# Patient Record
Sex: FEMALE | Race: WHITE | NOT HISPANIC OR LATINO | Employment: OTHER | URBAN - METROPOLITAN AREA
[De-identification: names, ages, dates, MRNs, and addresses within clinical notes are randomized per-mention and may not be internally consistent; named-entity substitution may affect disease eponyms.]

---

## 2017-01-03 ENCOUNTER — HOSPITAL ENCOUNTER (OUTPATIENT)
Dept: GENERAL RADIOLOGY | Facility: HOSPITAL | Age: 67
Discharge: HOME | End: 2017-01-03
Admitting: NURSE PRACTITIONER

## 2017-01-03 ENCOUNTER — OFFICE VISIT (OUTPATIENT)
Dept: INTERNAL MEDICINE | Facility: CLINIC | Age: 67
End: 2017-01-03

## 2017-01-03 VITALS
SYSTOLIC BLOOD PRESSURE: 138 MMHG | HEIGHT: 62 IN | OXYGEN SATURATION: 96 % | TEMPERATURE: 102.3 F | DIASTOLIC BLOOD PRESSURE: 62 MMHG | HEART RATE: 110 BPM | WEIGHT: 106.6 LBS | BODY MASS INDEX: 19.62 KG/M2

## 2017-01-03 DIAGNOSIS — R50.9 FEVER, UNSPECIFIED FEVER CAUSE: Primary | ICD-10-CM

## 2017-01-03 DIAGNOSIS — R52 BODY ACHES: ICD-10-CM

## 2017-01-03 DIAGNOSIS — R53.83 FATIGUE, UNSPECIFIED TYPE: ICD-10-CM

## 2017-01-03 DIAGNOSIS — J32.9 SINUSITIS, UNSPECIFIED CHRONICITY, UNSPECIFIED LOCATION: ICD-10-CM

## 2017-01-03 LAB
ALBUMIN SERPL-MCNC: 3.9 G/DL (ref 3.2–4.8)
ALBUMIN/GLOB SERPL: 1.6 G/DL (ref 1.5–2.5)
ALP SERPL-CCNC: 94 U/L (ref 25–100)
ALT SERPL W P-5'-P-CCNC: 21 U/L (ref 7–40)
ANION GAP SERPL CALCULATED.3IONS-SCNC: 9 MMOL/L (ref 3–11)
AST SERPL-CCNC: 22 U/L (ref 0–33)
BASOPHILS # BLD AUTO: 0.03 10*3/MM3 (ref 0–0.2)
BASOPHILS NFR BLD AUTO: 0.3 % (ref 0–1)
BILIRUB BLD-MCNC: NEGATIVE MG/DL
BILIRUB SERPL-MCNC: 0.4 MG/DL (ref 0.3–1.2)
BUN BLD-MCNC: 11 MG/DL (ref 9–23)
BUN/CREAT SERPL: 18.3 (ref 7–25)
CALCIUM SPEC-SCNC: 9.2 MG/DL (ref 8.7–10.4)
CHLORIDE SERPL-SCNC: 100 MMOL/L (ref 99–109)
CLARITY, POC: CLEAR
CO2 SERPL-SCNC: 29 MMOL/L (ref 20–31)
COLOR UR: YELLOW
CREAT BLD-MCNC: 0.6 MG/DL (ref 0.6–1.3)
DEPRECATED RDW RBC AUTO: 44.7 FL (ref 37–54)
EOSINOPHIL # BLD AUTO: 0.13 10*3/MM3 (ref 0.1–0.3)
EOSINOPHIL NFR BLD AUTO: 1.2 % (ref 0–3)
ERYTHROCYTE [DISTWIDTH] IN BLOOD BY AUTOMATED COUNT: 13.2 % (ref 11.3–14.5)
EXPIRATION DATE: NORMAL
FLUAV AG NPH QL: NEGATIVE
FLUBV AG NPH QL: NEGATIVE
GFR SERPL CREATININE-BSD FRML MDRD: 100 ML/MIN/1.73
GLOBULIN UR ELPH-MCNC: 2.5 GM/DL
GLUCOSE BLD-MCNC: 111 MG/DL (ref 70–100)
GLUCOSE UR STRIP-MCNC: NEGATIVE MG/DL
HCT VFR BLD AUTO: 37.1 % (ref 34.5–44)
HGB BLD-MCNC: 12.5 G/DL (ref 11.5–15.5)
IMM GRANULOCYTES # BLD: 0.03 10*3/MM3 (ref 0–0.03)
IMM GRANULOCYTES NFR BLD: 0.3 % (ref 0–0.6)
INTERNAL CONTROL: NORMAL
KETONES UR QL: NEGATIVE
LEUKOCYTE EST, POC: NEGATIVE
LYMPHOCYTES # BLD AUTO: 1.33 10*3/MM3 (ref 0.6–4.8)
LYMPHOCYTES NFR BLD AUTO: 12.4 % (ref 24–44)
Lab: NORMAL
MCH RBC QN AUTO: 31 PG (ref 27–31)
MCHC RBC AUTO-ENTMCNC: 33.7 G/DL (ref 32–36)
MCV RBC AUTO: 92.1 FL (ref 80–99)
MONOCYTES # BLD AUTO: 0.88 10*3/MM3 (ref 0–1)
MONOCYTES NFR BLD AUTO: 8.2 % (ref 0–12)
NEUTROPHILS # BLD AUTO: 8.33 10*3/MM3 (ref 1.5–8.3)
NEUTROPHILS NFR BLD AUTO: 77.6 % (ref 41–71)
NITRITE UR-MCNC: NEGATIVE MG/ML
PH UR: 7 [PH] (ref 5–8)
PLATELET # BLD AUTO: 348 10*3/MM3 (ref 150–450)
PMV BLD AUTO: 9.4 FL (ref 6–12)
POTASSIUM BLD-SCNC: 3.9 MMOL/L (ref 3.5–5.5)
PROT SERPL-MCNC: 6.4 G/DL (ref 5.7–8.2)
PROT UR STRIP-MCNC: NEGATIVE MG/DL
RBC # BLD AUTO: 4.03 10*6/MM3 (ref 3.89–5.14)
RBC # UR STRIP: NEGATIVE /UL
SODIUM BLD-SCNC: 138 MMOL/L (ref 132–146)
SP GR UR: 1.01 (ref 1–1.03)
UROBILINOGEN UR QL: NORMAL
WBC NRBC COR # BLD: 10.73 10*3/MM3 (ref 3.5–10.8)

## 2017-01-03 PROCEDURE — 71020 HC CHEST PA AND LATERAL: CPT

## 2017-01-03 PROCEDURE — 87804 INFLUENZA ASSAY W/OPTIC: CPT | Performed by: NURSE PRACTITIONER

## 2017-01-03 PROCEDURE — 80053 COMPREHEN METABOLIC PANEL: CPT | Performed by: NURSE PRACTITIONER

## 2017-01-03 PROCEDURE — 85025 COMPLETE CBC W/AUTO DIFF WBC: CPT | Performed by: NURSE PRACTITIONER

## 2017-01-03 PROCEDURE — 99214 OFFICE O/P EST MOD 30 MIN: CPT | Performed by: NURSE PRACTITIONER

## 2017-01-03 PROCEDURE — 81003 URINALYSIS AUTO W/O SCOPE: CPT | Performed by: NURSE PRACTITIONER

## 2017-01-03 RX ORDER — OXAZEPAM 10 MG
10 CAPSULE ORAL 3 TIMES DAILY
COMMUNITY
Start: 2016-12-20

## 2017-01-03 RX ORDER — CEFUROXIME AXETIL 250 MG/1
250 TABLET ORAL 2 TIMES DAILY
Qty: 20 TABLET | Refills: 0 | Status: SHIPPED | OUTPATIENT
Start: 2017-01-03 | End: 2017-01-20

## 2017-01-03 RX ORDER — ONDANSETRON 4 MG/1
4 TABLET, FILM COATED ORAL EVERY 8 HOURS PRN
Qty: 30 TABLET | Refills: 0 | Status: SHIPPED | OUTPATIENT
Start: 2017-01-03 | End: 2022-02-21

## 2017-01-03 NOTE — PROGRESS NOTES
Subjective   Kelly Waldrop is a 66 y.o. female    Chief Complaint   Patient presents with   • Fever     Has been taking Tylenol. Still taking Cipro but feels much worse than last Tuesday.    • Headache   • Generalized Body Aches   • Fatigue   • Chills     History of Present Illness     Pt presents with 2 week duration of fatigue, body aches and a fever.  She has a great deal of sinus and facial pressure, along with PND and a dry cough (non-productive).  Was seen at the  on 12/27/2016.  She was dx'd with sinusitis and started on Cipro.  She has continued to feel worse instead of better.  Her fever has continued to go up.  No CP or SOA.  No N/V/D.  No rashes.    The following portions of the patient's history were reviewed and updated as appropriate: allergies, current medications, past family history, past medical history, past social history, past surgical history and problem list.    Current Outpatient Prescriptions:   •  busPIRone (BUSPAR) 15 MG tablet, Take 15 mg by mouth 3 (Three) Times a Day., Disp: , Rfl:   •  ciprofloxacin (CIPRO) 250 MG tablet, Take 1 tablet by mouth 2 (Two) Times a Day for 10 days., Disp: 20 tablet, Rfl: 0  •  estradiol (ESTRACE) 0.1 MG/GM vaginal cream, Insert 2 g into the vagina Daily., Disp: , Rfl:   •  oxazepam (SERAX) 10 MG capsule, , Disp: , Rfl:   •  PARoxetine (PAXIL) 10 MG tablet, Take 10 mg by mouth Every Morning., Disp: , Rfl:   •  sulfacetamide (BLEPH-10) 10 % ophthalmic solution, 1 drop Every 3 (Three) Hours., Disp: , Rfl:   •  ZOLPIDEM TARTRATE SL, Place  under the tongue., Disp: , Rfl:      Review of Systems   Constitutional: Positive for chills, fatigue and fever.   HENT: Positive for congestion, postnasal drip, rhinorrhea and sinus pressure.    Respiratory: Positive for cough and shortness of breath. Negative for chest tightness.    Cardiovascular: Negative for chest pain.   Gastrointestinal: Negative for abdominal pain, diarrhea, nausea and vomiting.   Endocrine:  "Negative for cold intolerance and heat intolerance.   Musculoskeletal: Positive for arthralgias.   Neurological: Negative for dizziness.       Objective   Physical Exam   Constitutional: She is oriented to person, place, and time. She appears well-developed and well-nourished.   HENT:   Head: Normocephalic and atraumatic.   Right Ear: A middle ear effusion is present.   Left Ear: A middle ear effusion is present.   Nose: Right sinus exhibits maxillary sinus tenderness and frontal sinus tenderness. Left sinus exhibits maxillary sinus tenderness and frontal sinus tenderness.   Mouth/Throat: Posterior oropharyngeal erythema present.   Eyes: Conjunctivae and EOM are normal. Pupils are equal, round, and reactive to light.   Neck: Normal range of motion.   Cardiovascular: Normal rate, regular rhythm and normal heart sounds.    Pulmonary/Chest: Effort normal and breath sounds normal.   Abdominal: Soft. Bowel sounds are normal.   Musculoskeletal: Normal range of motion.   Neurological: She is alert and oriented to person, place, and time. She has normal reflexes.   Skin: Skin is warm and dry.   Psychiatric: She has a normal mood and affect. Her behavior is normal. Judgment and thought content normal.     Vitals:    01/03/17 1614   BP: 138/62   Pulse: 110   Temp: (!) 102.3 °F (39.1 °C)   TempSrc: Temporal Artery    SpO2: 96%   Weight: 106 lb 9.6 oz (48.4 kg)   Height: 62\" (157.5 cm)         Assessment/Plan   Kelly was seen today for fever, headache, generalized body aches, fatigue and chills.    Diagnoses and all orders for this visit:    Fever, unspecified fever cause  -     POC Urinalysis Dipstick, Automated  -     POC Influenza A / B  -     CBC & Differential  -     Comprehensive Metabolic Panel  -     XR Chest PA & Lateral  -     CBC Auto Differential    Body aches  -     POC Urinalysis Dipstick, Automated  -     POC Influenza A / B  -     CBC & Differential  -     Comprehensive Metabolic Panel  -     XR Chest PA & " Lateral  -     CBC Auto Differential    Fatigue, unspecified type  -     POC Urinalysis Dipstick, Automated  -     POC Influenza A / B  -     CBC & Differential  -     Comprehensive Metabolic Panel  -     XR Chest PA & Lateral  -     CBC Auto Differential    Sinusitis, unspecified chronicity, unspecified location  -     cefuroxime (CEFTIN) 250 MG tablet; Take 1 tablet by mouth 2 (Two) Times a Day.    Other orders  -     ondansetron (ZOFRAN) 4 MG tablet; Take 1 tablet by mouth Every 8 (Eight) Hours As Needed for nausea or vomiting.      Labs sent  Sent for a CXR  Antibiotic changed to Ceftin, zofran to take PRN for nausea  Flu and strep negative.  UA is clear  Increase fluids  Tylenol and Ibuprofen PRN  RTC if sx's worsen or do not improve

## 2017-01-03 NOTE — MR AVS SNAPSHOT
Kelly Waldrop   1/3/2017 4:30 PM   Office Visit    Dept Phone:  [phone]   Encounter #:  [id number]    Provider:  SHALONDA Rousseau   Department:  [facility] INTERNAL MEDICINE                Your Full Care Plan              Today's Medication Changes          These changes are accurate as of: 1/3/17  4:54 PM.  If you have any questions, ask your nurse or doctor.               New Medication(s)Ordered:     cefuroxime 250 MG tablet   Commonly known as:  CEFTIN   Take 1 tablet by mouth 2 (Two) Times a Day.   Started by:  HSALONDA Rousseau       ondansetron 4 MG tablet   Commonly known as:  ZOFRAN   Take 1 tablet by mouth Every 8 (Eight) Hours As Needed for nausea or vomiting.   Started by:  SHALONDA Rousseau         Medication(s)that have changed:     oxazepam 10 MG capsule   Commonly known as:  SERAX   What changed:  Another medication with the same name was removed. Continue taking this medication, and follow the directions you see here.   Changed by:  SHALONDA Rousseau         Stop taking medication(s)listed here:     ciprofloxacin 250 MG tablet   Commonly known as:  CIPRO   Stopped by:  SHALONDA Rousseau                Where to Get Your Medications      These medications were sent to [facility] [address] [place] [address] [address] [place] [address] [phone]  - [phone] [address] [address]     Phone:  [phone]     cefuroxime 250 MG tablet    ondansetron 4 MG tablet                  Your Updated Medication List          This list is accurate as of: 1/3/17  4:54 PM.  Always use your most recent med list.                busPIRone 15 MG tablet   Commonly known as:  BUSPAR       cefuroxime 250 MG tablet   Commonly known as:  CEFTIN   Take 1 tablet by mouth 2 (Two) Times a Day.       estradiol 0.1 MG/GM vaginal cream   Commonly known as:  ESTRACE       ondansetron 4 MG tablet   Commonly known as:  ZOFRAN   Take 1 tablet by mouth Every 8 (Eight) Hours As Needed for nausea or vomiting.       oxazepam 10 MG capsule   Commonly known as:  SERAX       PARoxetine 10 MG tablet   Commonly known as:  PAXIL       sulfacetamide 10 % ophthalmic solution   Commonly known as:  BLEPH-10       ZOLPIDEM TARTRATE SL               We Performed the Following     CBC & Differential     CBC Auto Differential     Comprehensive Metabolic Panel     POC Influenza A / B     POC Urinalysis Dipstick, Automated     XR Chest PA & Lateral       You Were Diagnosed With        Codes Comments    Fever, unspecified fever cause    -  Primary ICD-10-CM: R50.9  ICD-9-CM: 780.60     Body aches     ICD-10-CM: R52  ICD-9-CM: 780.96     Fatigue, unspecified type     ICD-10-CM: R53.83  ICD-9-CM: 780.79     Sinusitis, unspecified chronicity, unspecified location     ICD-10-CM: J32.9  ICD-9-CM: 473.9       Instructions     None    Patient Instructions History      Upcoming Appointments     Visit Type Date Time Department    OFFICE VISIT 1/3/2017  4:30 PM [address]      [employer] Signup     [facility] [employer] allows you to send messages to your doctor, view your test results, renew your prescriptions, schedule appointments, and more. To sign up, go to [employer] and click on the Sign Up Now link in the New User? box. Enter your [employer] Activation Code exactly as it appears below along with the last four digits of your Social Security Number and your Date of Birth () to complete the sign-up process. If you do not sign up before the expiration date, you must request a new code.    [employer] Activation Code: [email or web address]  Expires: 1/10/2017  4:48 PM    If you have questions, you can email [email or web address] or call [phone] to talk to our [employer] staff. Remember, [employer] is NOT to be used for urgent needs. For medical emergencies, dial 911.               Other Info  "from Your Visit           Allergies     No Known Allergies      Reason for Visit     Fever Has been taking Tylenol. Still taking Cipro but feels much worse than last Tuesday.     Headache     Generalized Body Aches     Fatigue     Chills           Vital Signs     Blood Pressure Pulse Temperature Height Weight Oxygen Saturation    138/62 110 102.3 °F (39.1 °C) (Temporal Artery ) 62\" (157.5 cm) 106 lb 9.6 oz (48.4 kg) 96%    Body Mass Index Smoking Status                19.5 kg/m2 Never Smoker          Problems and Diagnoses Noted     Fever    -  Primary    Body aches        Tiredness        Sinus infection          Results     POC Urinalysis Dipstick, Automated      Component Value Standard Range & Units    Color Yellow Yellow, Straw, Dark Yellow, Mercedes    Clarity, UA Clear Clear    Glucose, UA Negative Negative, 1000 mg/dL (3+) mg/dL    Bilirubin Negative Negative    Ketones, UA Negative Negative    Specific Gravity  1.015 1.005 - 1.030    Blood, UA Negative Negative    pH, Urine 7.0 5.0 - 8.0    Protein, POC Negative Negative mg/dL    Urobilinogen, UA Normal Normal    Leukocytes Negative Negative    Nitrite, UA Negative Negative                POC Influenza A / B      Component Value Standard Range & Units    Rapid Influenza A Ag negative     Rapid Influenza B Ag negative     Internal Control Passed Passed    Lot Number [id number]     Expiration Date 3/31/2018                     "

## 2017-01-05 ENCOUNTER — OFFICE VISIT (OUTPATIENT)
Dept: INTERNAL MEDICINE | Facility: CLINIC | Age: 67
End: 2017-01-05

## 2017-01-05 ENCOUNTER — TELEPHONE (OUTPATIENT)
Dept: INTERNAL MEDICINE | Facility: CLINIC | Age: 67
End: 2017-01-05

## 2017-01-05 VITALS
RESPIRATION RATE: 20 BRPM | WEIGHT: 107 LBS | TEMPERATURE: 98.6 F | HEIGHT: 62 IN | DIASTOLIC BLOOD PRESSURE: 68 MMHG | SYSTOLIC BLOOD PRESSURE: 140 MMHG | HEART RATE: 116 BPM | BODY MASS INDEX: 19.69 KG/M2

## 2017-01-05 DIAGNOSIS — J18.9 PNEUMONIA OF RIGHT MIDDLE LOBE DUE TO INFECTIOUS ORGANISM: Primary | ICD-10-CM

## 2017-01-05 PROCEDURE — 99213 OFFICE O/P EST LOW 20 MIN: CPT | Performed by: FAMILY MEDICINE

## 2017-01-05 NOTE — TELEPHONE ENCOUNTER
Kelly has been notified. She said she has felt better since taking the abx you prescribed and her fever is back to normal. I told her she would need to stop that abx and start on the levaquin and even to be on the safe side she should  a OTC probiotic to take to help eliminate any GI discomfort.    Do you still need to send in the levaquin to her Pharmacy?

## 2017-01-05 NOTE — TELEPHONE ENCOUNTER
----- Message from SHALONDA Rousseau sent at 1/5/2017  9:56 AM EST -----  Please let pt know that her CXR was consistent with pneumonia.      Her labs looked ok.      I need to change her antibiotic to levaquin.      How is she feeling?

## 2017-01-05 NOTE — TELEPHONE ENCOUNTER
She saw MARIA EUGENIA today.  She is going to give the antibiotic, Ceftin a few more days so we will hold off.  MARIA EUGENIA will handle it from here.

## 2017-01-05 NOTE — MR AVS SNAPSHOT
Kelly Waldrop   2017 2:45 PM   Office Visit    Dept Phone:  [phone]   Encounter #:  [id number]    Provider:  Asuncion BAPTISTE MD   Department:  [facility] INTERNAL MEDICINE                Your Full Care Plan              Your Updated Medication List          This list is accurate as of: 17  3:34 PM.  Always use your most recent med list.                busPIRone 15 MG tablet   Commonly known as:  BUSPAR       cefuroxime 250 MG tablet   Commonly known as:  CEFTIN   Take 1 tablet by mouth 2 (Two) Times a Day.       estradiol 0.1 MG/GM vaginal cream   Commonly known as:  ESTRACE       ondansetron 4 MG tablet   Commonly known as:  ZOFRAN   Take 1 tablet by mouth Every 8 (Eight) Hours As Needed for nausea or vomiting.       oxazepam 10 MG capsule   Commonly known as:  SERAX       PARoxetine 10 MG tablet   Commonly known as:  PAXIL       sulfacetamide 10 % ophthalmic solution   Commonly known as:  BLEPH-10       ZOLPIDEM TARTRATE SL               You Were Diagnosed With        Codes Comments    Pneumonia of right middle lobe due to infectious organism    -  Primary ICD-10-CM: J18.9  ICD-9-CM: 483.8       Instructions     None    Patient Instructions History      Upcoming Appointments     Visit Type Date Time Department    OFFICE VISIT 2017  2:45 PM [address]    FOLLOW UP 2017  2:45 PM [address]      [employer] Signup     [facility] [employer] allows you to send messages to your doctor, view your test results, renew your prescriptions, schedule appointments, and more. To sign up, go to [employer] and click on the Sign Up Now link in the New User? box. Enter your [employer] Activation Code exactly as it appears below along with the last four digits of your Social Security Number and your Date of Birth () to complete the sign-up process. If you do not sign up before the expiration date, you must request a new  "code.    MyChart Activation Code: [email or web address]  Expires: 1/10/2017  4:48 PM    If you have questions, you can email [email or web address] or call [phone] to talk to our [employer] staff. Remember, [employer] is NOT to be used for urgent needs. For medical emergencies, dial 911.               Other Info from Your Visit           Your Appointments     Jan 20, 2017  2:45 PM EST   Follow Up with Asuncion BAPTISTE MD   [facility] INTERNAL MEDICINE (--)    [address] [address]   [phone]           Arrive 15 minutes prior to appointment.              Allergies     No Known Allergies      Reason for Visit     Follow-up X-ray results       Vital Signs     Blood Pressure Pulse Temperature Respirations Height Weight    140/68 116 98.6 °F (37 °C) (Temporal Artery ) 20 62\" (157.5 cm) 107 lb (48.5 kg)    Body Mass Index Smoking Status                19.57 kg/m2 Never Smoker          Problems and Diagnoses Noted     Pneumonia of right middle lobe due to infectious organism    -  Primary        "

## 2017-01-05 NOTE — PROGRESS NOTES
"Lou Waldrop is a 66 y.o. female.     Chief Complaint   Patient presents with   • Follow-up     X-ray results        Vitals:    01/05/17 1440   BP: 140/68   Pulse: 116   Resp: 20   Temp: 98.6 °F (37 °C)   TempSrc: Temporal Artery    Weight: 107 lb (48.5 kg)   Height: 62\" (157.5 cm)       Pneumonia   She complains of cough, frequent throat clearing and sputum production. There is no chest tightness, difficulty breathing, hemoptysis, hoarse voice, shortness of breath or wheezing. This is a new problem. The current episode started 1 to 4 weeks ago. The problem occurs constantly. The problem has been rapidly improving. The cough is productive of sputum and productive. Associated symptoms include appetite change, a fever, headaches, malaise/fatigue, nasal congestion, postnasal drip, rhinorrhea and sweats. Pertinent negatives include no chest pain, dyspnea on exertion, ear congestion, ear pain, heartburn, myalgias, orthopnea, PND, sneezing, sore throat, trouble swallowing or weight loss. Her symptoms are aggravated by nothing. Relieved by: antibiotic. She reports significant improvement on treatment. There are no known risk factors for lung disease. There is no history of asthma, bronchiectasis, bronchitis, COPD, emphysema or pneumonia.      Pt here for f/u. Pt had sinus infection after Karley and was treated UT and persisted with fever. Antibiotic were changed to ceftin and pt had sweating last night and fever broke last night.  Pt is feeling better today.     The following portions of the patient's history were reviewed and updated as appropriate: allergies, current medications, past family history, past medical history, past social history, past surgical history and problem list.    Pt is a nonsmoker  Past Medical History   Diagnosis Date   • Acute pharyngitis    • Anxiety    • Depression    • Influenza-like symptoms    • Mitral valve prolapse        Past Surgical History   Procedure Laterality " Date   • Tonsillectomy         No Known Allergies    .  Social History     Social History   • Marital status:      Spouse name: N/A   • Number of children: N/A   • Years of education: N/A     Occupational History   • Not on file.     Social History Main Topics   • Smoking status: Never Smoker   • Smokeless tobacco: Never Used   • Alcohol use No   • Drug use: No   • Sexual activity: Defer     Other Topics Concern   • Not on file     Social History Narrative    , 1 CHILD           No Known Allergies\  Review of Systems   Constitutional: Positive for appetite change, chills, diaphoresis, fatigue, fever and malaise/fatigue. Negative for weight loss.   HENT: Positive for postnasal drip and rhinorrhea. Negative for ear pain, hoarse voice, sneezing, sore throat and trouble swallowing.    Eyes: Negative.  Negative for redness and itching.   Respiratory: Positive for cough and sputum production. Negative for hemoptysis, shortness of breath and wheezing.    Cardiovascular: Negative.  Negative for chest pain, dyspnea on exertion, palpitations and PND.   Gastrointestinal: Negative.  Negative for abdominal pain, constipation, diarrhea, heartburn, nausea and vomiting.   Endocrine: Negative.  Negative for cold intolerance and heat intolerance.   Genitourinary: Negative.  Negative for dysuria and urgency.   Musculoskeletal: Negative.  Negative for arthralgias, back pain and myalgias.   Skin: Negative.    Allergic/Immunologic: Positive for environmental allergies.   Neurological: Positive for headaches.        Sinus headache   Hematological: Negative.  Negative for adenopathy. Does not bruise/bleed easily.   Psychiatric/Behavioral: Negative.  Negative for dysphoric mood. The patient is not nervous/anxious.        Objective   Physical Exam   Constitutional: She is oriented to person, place, and time. She appears well-developed.   HENT:   Head: Normocephalic.   Right Ear: Tympanic membrane, external ear and ear canal  normal.   Left Ear: Tympanic membrane, external ear and ear canal normal.   Nose: Rhinorrhea present. Right sinus exhibits no maxillary sinus tenderness and no frontal sinus tenderness. Left sinus exhibits no maxillary sinus tenderness and no frontal sinus tenderness.   Mouth/Throat: Uvula is midline and oropharynx is clear and moist. Normal dentition. No oropharyngeal exudate, posterior oropharyngeal edema or posterior oropharyngeal erythema.   Eyes: Conjunctivae and EOM are normal. Pupils are equal, round, and reactive to light.   Neck: Normal range of motion. Neck supple. No thyroid mass and no thyromegaly present.   Cardiovascular: Normal rate and regular rhythm.    No murmur heard.  Pulmonary/Chest: Effort normal and breath sounds normal.   Abdominal: Soft. Bowel sounds are normal.   Musculoskeletal: Normal range of motion.   Lymphadenopathy:        Head (right side): No submandibular adenopathy present.        Head (left side): No submandibular adenopathy present.        Right cervical: No superficial cervical, no deep cervical and no posterior cervical adenopathy present.       Left cervical: No superficial cervical, no deep cervical and no posterior cervical adenopathy present.   Neurological: She is alert and oriented to person, place, and time.   Skin: Skin is warm and dry.   Psychiatric: She has a normal mood and affect. Her behavior is normal.   Nursing note and vitals reviewed.      Assessment/Plan   Kelly was seen today for follow-up.    Diagnoses and all orders for this visit:    Pneumonia of right middle lobe due to infectious organism      Continue antibiotic. If not continuing to improve tomorrow will switch to levaquin.             Current Outpatient Prescriptions:   •  busPIRone (BUSPAR) 15 MG tablet, Take 15 mg by mouth 3 (Three) Times a Day., Disp: , Rfl:   •  cefuroxime (CEFTIN) 250 MG tablet, Take 1 tablet by mouth 2 (Two) Times a Day., Disp: 20 tablet, Rfl: 0  •  estradiol (ESTRACE) 0.1  MG/GM vaginal cream, Insert 2 g into the vagina Daily., Disp: , Rfl:   •  ondansetron (ZOFRAN) 4 MG tablet, Take 1 tablet by mouth Every 8 (Eight) Hours As Needed for nausea or vomiting., Disp: 30 tablet, Rfl: 0  •  oxazepam (SERAX) 10 MG capsule, , Disp: , Rfl:   •  PARoxetine (PAXIL) 10 MG tablet, Take 10 mg by mouth Every Morning., Disp: , Rfl:   •  sulfacetamide (BLEPH-10) 10 % ophthalmic solution, 1 drop Every 3 (Three) Hours., Disp: , Rfl:   •  ZOLPIDEM TARTRATE SL, Place  under the tongue., Disp: , Rfl:     Return in about 1 week (around 1/12/2017), or if symptoms worsen or fail to improve, for Recheck.

## 2017-01-05 NOTE — TELEPHONE ENCOUNTER
----- Message from Katie Rondon sent at 1/5/2017 11:24 AM EST -----  PT WANTS DR HAQUE TO GIVE HER A CALL

## 2017-01-06 RX ORDER — LEVOFLOXACIN 500 MG/1
500 TABLET, FILM COATED ORAL DAILY
Qty: 7 TABLET | Refills: 0 | Status: SHIPPED | OUTPATIENT
Start: 2017-01-06 | End: 2017-01-20

## 2017-01-20 ENCOUNTER — HOSPITAL ENCOUNTER (OUTPATIENT)
Dept: GENERAL RADIOLOGY | Facility: HOSPITAL | Age: 67
Discharge: HOME | End: 2017-01-20
Admitting: FAMILY MEDICINE

## 2017-01-20 ENCOUNTER — OFFICE VISIT (OUTPATIENT)
Dept: INTERNAL MEDICINE | Facility: CLINIC | Age: 67
End: 2017-01-20

## 2017-01-20 VITALS
DIASTOLIC BLOOD PRESSURE: 70 MMHG | WEIGHT: 105.4 LBS | SYSTOLIC BLOOD PRESSURE: 122 MMHG | BODY MASS INDEX: 19.4 KG/M2 | HEART RATE: 83 BPM | HEIGHT: 62 IN | TEMPERATURE: 98.2 F | OXYGEN SATURATION: 97 %

## 2017-01-20 DIAGNOSIS — Z09 EXAMINATION, FOLLOW UP: Primary | ICD-10-CM

## 2017-01-20 DIAGNOSIS — Z23 NEED FOR STREPTOCOCCUS PNEUMONIAE VACCINATION: ICD-10-CM

## 2017-01-20 DIAGNOSIS — J18.9 PNEUMONIA OF RIGHT MIDDLE LOBE DUE TO INFECTIOUS ORGANISM: ICD-10-CM

## 2017-01-20 PROCEDURE — 99213 OFFICE O/P EST LOW 20 MIN: CPT | Performed by: FAMILY MEDICINE

## 2017-01-20 PROCEDURE — 71020 HC CHEST PA AND LATERAL: CPT

## 2017-01-20 NOTE — PROGRESS NOTES
"Lou Waldrop is a 66 y.o. female.     Chief Complaint   Patient presents with   • Pneumonia     2 week follow up       Vitals:    01/20/17 1527   BP: 122/70   Pulse: 83   Temp: 98.2 °F (36.8 °C)   SpO2: 97%   Weight: 105 lb 6.4 oz (47.8 kg)   Height: 62\" (157.5 cm)       Pneumonia   She complains of frequent throat clearing and sputum production. There is no chest tightness, cough, difficulty breathing, hemoptysis, hoarse voice, shortness of breath or wheezing. This is a new problem. The current episode started 1 to 4 weeks ago. The problem occurs intermittently. The problem has been gradually improving. Pertinent negatives include no appetite change, chest pain, dyspnea on exertion, ear congestion, ear pain, fever, headaches, heartburn, malaise/fatigue, myalgias, nasal congestion, orthopnea, PND, postnasal drip, rhinorrhea, sneezing, sore throat, sweats, trouble swallowing or weight loss. Her symptoms are aggravated by climbing stairs and exercise. Her symptoms are alleviated by rest. She reports significant improvement on treatment.      Pt here for f/u of pneumonia.   Pt is still tired. Pt has finished first set of antibiotics and still gets tired on exertion.  No further fever or cough.  Pt does [employer].     The following portions of the patient's history were reviewed and updated as appropriate: allergies, current medications, past family history, past medical history, past social history, past surgical history and problem list.    Pt is a nonsmoker  Past Medical History   Diagnosis Date   • Acute pharyngitis    • Anxiety    • Depression    • Influenza-like symptoms    • Mitral valve prolapse        Past Surgical History   Procedure Laterality Date   • Tonsillectomy         No Known Allergies    Social History     Social History   • Marital status:      Spouse name: N/A   • Number of children: N/A   • Years of education: N/A     Occupational History   • Not on file.     Social " History Main Topics   • Smoking status: Never Smoker   • Smokeless tobacco: Never Used   • Alcohol use No   • Drug use: No   • Sexual activity: Defer     Other Topics Concern   • Not on file     Social History Narrative    , 1 CHILD           No family history on file.    Review of Systems   Constitutional: Positive for fatigue. Negative for appetite change, chills, diaphoresis, fever, malaise/fatigue and weight loss.   HENT: Negative.  Negative for ear pain, hoarse voice, nosebleeds, postnasal drip, rhinorrhea, sinus pressure, sneezing, sore throat, tinnitus and trouble swallowing.    Eyes: Negative.  Negative for redness and itching.   Respiratory: Positive for sputum production. Negative for cough, hemoptysis, shortness of breath and wheezing.    Cardiovascular: Negative.  Negative for chest pain, dyspnea on exertion, palpitations and PND.        Heart races occasionally   Gastrointestinal: Negative.  Negative for abdominal pain, anal bleeding, blood in stool, constipation, diarrhea, heartburn, nausea and vomiting.   Endocrine: Negative.  Negative for cold intolerance and heat intolerance.   Genitourinary: Negative.  Negative for dysuria, frequency, hematuria and urgency.   Musculoskeletal: Negative.  Negative for arthralgias, back pain, myalgias and neck pain.   Skin: Negative.  Negative for color change and rash.   Allergic/Immunologic: Negative.  Negative for environmental allergies.   Neurological: Positive for weakness. Negative for dizziness, syncope, numbness and headaches.   Hematological: Negative.  Negative for adenopathy. Does not bruise/bleed easily.   Psychiatric/Behavioral: Negative.  Negative for dysphoric mood. The patient is not nervous/anxious.        Objective   Physical Exam   Constitutional: She is oriented to person, place, and time. She appears well-developed.   HENT:   Head: Normocephalic.   Right Ear: Tympanic membrane, external ear and ear canal normal.   Left Ear: Tympanic  membrane, external ear and ear canal normal.   Nose: Nose normal.   Mouth/Throat: Uvula is midline and oropharynx is clear and moist. No oropharyngeal exudate, posterior oropharyngeal edema or posterior oropharyngeal erythema.   Eyes: Conjunctivae and EOM are normal. Pupils are equal, round, and reactive to light.   Neck: Normal range of motion. Neck supple.   Cardiovascular: Normal rate and regular rhythm.    No murmur heard.  Pulmonary/Chest: Effort normal and breath sounds normal.   Abdominal: Soft. Bowel sounds are normal.   Musculoskeletal: Normal range of motion.   Lymphadenopathy:        Head (right side): No submandibular adenopathy present.        Head (left side): No submandibular adenopathy present.     She has no cervical adenopathy.   Neurological: She is alert and oriented to person, place, and time.   Skin: Skin is warm and dry.   Psychiatric: She has a normal mood and affect. Her behavior is normal.   Nursing note and vitals reviewed.      Assessment/Plan   Kelly was seen today for pneumonia.    Diagnoses and all orders for this visit:    Examination, follow up    Pneumonia of right middle lobe due to infectious organism  -     XR Chest PA & Lateral    Need for Streptococcus pneumoniae vaccination  -     Cancel: Pneumococcal Polysaccharide Vaccine 23-Valent Greater Than or Equal To 3yo Subcutaneous / IM        Get f/u chest xray  Pt had flu vaccine  pneumonvax 23 next week, pt does not want to do today           Current Outpatient Prescriptions:   •  busPIRone (BUSPAR) 15 MG tablet, Take 15 mg by mouth 2 (Two) Times a Day., Disp: , Rfl:   •  estradiol (ESTRACE) 0.1 MG/GM vaginal cream, Insert 2 g into the vagina Daily., Disp: , Rfl:   •  oxazepam (SERAX) 10 MG capsule, Take 10 mg by mouth At Night As Needed., Disp: , Rfl:   •  PARoxetine (PAXIL) 10 MG tablet, Take 10 mg by mouth Every Morning. Take 1/4 every other day, Disp: , Rfl:   •  ZOLPIDEM TARTRATE SL, Place  under the tongue., Disp: , Rfl:    •  ondansetron (ZOFRAN) 4 MG tablet, Take 1 tablet by mouth Every 8 (Eight) Hours As Needed for nausea or vomiting., Disp: 30 tablet, Rfl: 0    Return in about 4 weeks (around 2/17/2017), or if symptoms worsen or fail to improve, for Recheck.

## 2017-01-20 NOTE — MR AVS SNAPSHOT
Kelly S Palma   1/20/2017 2:45 PM   Office Visit    Dept Phone:  [phone]   Encounter #:  [id number]    Provider:  Asuncion BAPTISTE MD   Department:  [facility] INTERNAL MEDICINE                Your Full Care Plan              Today's Medication Changes          These changes are accurate as of: 1/20/17  4:31 PM.  If you have any questions, ask your nurse or doctor.               Stop taking medication(s)listed here:     cefuroxime 250 MG tablet   Commonly known as:  CEFTIN   Stopped by:  Asuncion BAPTISTE MD           levoFLOXacin 500 MG tablet   Commonly known as:  LEVAQUIN   Stopped by:  Asuncion BAPTISTE MD           sulfacetamide 10 % ophthalmic solution   Commonly known as:  BLEPH-10   Stopped by:  Asuncion BAPTISTE MD                      Your Updated Medication List          This list is accurate as of: 1/20/17  4:31 PM.  Always use your most recent med list.                busPIRone 15 MG tablet   Commonly known as:  BUSPAR       estradiol 0.1 MG/GM vaginal cream   Commonly known as:  ESTRACE       ondansetron 4 MG tablet   Commonly known as:  ZOFRAN   Take 1 tablet by mouth Every 8 (Eight) Hours As Needed for nausea or vomiting.       oxazepam 10 MG capsule   Commonly known as:  SERAX       PARoxetine 10 MG tablet   Commonly known as:  PAXIL       ZOLPIDEM TARTRATE SL               We Performed the Following     XR Chest PA & Lateral       You Were Diagnosed With        Codes Comments    Examination, follow up    -  Primary ICD-10-CM: Z09  ICD-9-CM: V67.9     Pneumonia of right middle lobe due to infectious organism     ICD-10-CM: J18.9  ICD-9-CM: 483.8     Need for Streptococcus pneumoniae vaccination     ICD-10-CM: Z23  ICD-9-CM: V03.82       Instructions     None    Patient Instructions History      Upcoming Appointments     Visit Type Date Time Department    FOLLOW UP 1/20/2017  2:45 PM WERNER Wilde Signup     Our records  "indicate that you have an active [facility] [employer] account.    You can view your After Visit Summary by going to [employer] and logging in with your [employer] username and password.  If you don't have a [employer] username and password but a parent or guardian has access to your record, the parent or guardian should login with their own [employer] username and password and access your record to view the After Visit Summary.    If you have questions, you can email [email or web address] or call [phone] to talk to our [employer] staff.  Remember, [employer] is NOT to be used for urgent needs.  For medical emergencies, dial 911.               Other Info from Your Visit           Allergies     No Known Allergies      Reason for Visit     Pneumonia 2 week follow up      Vital Signs     Blood Pressure Pulse Temperature Height Weight Last Menstrual Period    122/70 83 98.2 °F (36.8 °C) 62\" (157.5 cm) 105 lb 6.4 oz (47.8 kg) (LMP Unknown)    Oxygen Saturation Body Mass Index Smoking Status             97% 19.28 kg/m2 Never Smoker         Problems and Diagnoses Noted     Examination, follow up    -  Primary    Pneumonia of right middle lobe due to infectious organism        Need for pneumococcal vaccine            "

## 2017-01-26 ENCOUNTER — TELEPHONE (OUTPATIENT)
Dept: INTERNAL MEDICINE | Facility: CLINIC | Age: 67
End: 2017-01-26

## 2017-01-26 NOTE — TELEPHONE ENCOUNTER
----- Message from Asuncion BAPTISTE MD sent at 1/26/2017  1:45 PM EST -----  Chest xray will lag behind symptoms, as long as she if feeling better she can start slowly to exercise. If no symptoms then no antibiotics  ----- Message -----     From: Kaity Mak     Sent: 1/26/2017  10:18 AM       To: Asuncion BAPTISTE MD    Is it ok to start exercise.  See below  ----- Message -----     From: Kadie Hoff MA     Sent: 1/26/2017   9:17 AM       To: Kaity Mak    Pt would like to start exercise and wants to make sure she does not need an abx.    Report said improvement in chest xray but not gone.    Please call pt

## 2017-09-12 ENCOUNTER — TELEPHONE (OUTPATIENT)
Dept: INTERNAL MEDICINE | Facility: CLINIC | Age: 67
End: 2017-09-12

## 2017-09-12 NOTE — TELEPHONE ENCOUNTER
Pt called 2nd time and has not received her medical records that were sent out on 8.18.17.  I have faxed the Epic chart information to new [facility]. [facility] [address].    I have printed her [facility] records and will mail them to the office.

## 2019-01-28 ENCOUNTER — OFFICE VISIT (OUTPATIENT)
Dept: OBSTETRICS AND GYNECOLOGY | Facility: CLINIC | Age: 69
End: 2019-01-28

## 2019-01-28 VITALS
BODY MASS INDEX: 19.32 KG/M2 | HEIGHT: 62 IN | SYSTOLIC BLOOD PRESSURE: 100 MMHG | WEIGHT: 105 LBS | DIASTOLIC BLOOD PRESSURE: 60 MMHG

## 2019-01-28 DIAGNOSIS — N95.0 POSTMENOPAUSAL BLEEDING: ICD-10-CM

## 2019-01-28 DIAGNOSIS — N84.0 ENDOMETRIAL POLYP: ICD-10-CM

## 2019-01-28 DIAGNOSIS — Z01.419 WELL WOMAN EXAM: Primary | ICD-10-CM

## 2019-01-28 PROCEDURE — G0101 CA SCREEN;PELVIC/BREAST EXAM: HCPCS | Performed by: OBSTETRICS & GYNECOLOGY

## 2019-01-28 RX ORDER — ZOLPIDEM TARTRATE 10 MG/1
10 TABLET ORAL NIGHTLY PRN
COMMUNITY

## 2019-01-28 RX ORDER — FLUTICASONE PROPIONATE 0.05 %
1 CREAM (GRAM) TOPICAL DAILY PRN
COMMUNITY

## 2019-01-28 NOTE — PROGRESS NOTES
Subjective   Chief Complaint   Patient presents with   • Gynecologic Exam     here for annual and pap,      Kellyvika Waldrop is a 68 y.o. year old  menopausal female presenting to be seen for her annual exam.  This past year she has been on hormone replacement therapy.  There has not been vaginal bleeding in the last 12 months.  Menopausal symptoms are not present.  Patient presents for an annual exam and Pap smear.  She reports no menopausal symptoms.  The patient has been on Estrace for burning on urination which was prescribed by the urologist.  This is been very successful.  Patient has had 3 episodes of blood in her urine.  She has had no staining on her linings that she wears.  Patient has appointment with urology for hematuria.  Patient is a mammogram scheduled this month at [facility].  Presents today for a Pap smear and pelvic exam.  Brownish discharge was noted on pelvic exam and a vaginal ultrasound to measure endometrial thickness will be done today.  On transvaginal ultrasound there appears to be blood within the endometrial cavity and a possible endometrial polyp is noted.    SEXUAL Hx:  She is currently sexually active.  In the past year there has not been new sexual partners.    Condoms are not typically used.  She would not like to be screened for STD's at today's exam.  HEALTH Hx:  She exercises regularly: yes.  She wears her seat belt:yes.  She has concerns about domestic violence: no.  She has noticed changes in height: no.  OTHER COMPLAINTS:  Nothing else    The following portions of the patient's history were reviewed and updated as appropriate:problem list, current medications, allergies, past family history, past medical history, past social history and past surgical history.    Social History    Tobacco Use      Smoking status: Never Smoker      Smokeless tobacco: Never Used      Review of Systems  Review of Systems - History obtained from the patient  General ROS:  "negative  Psychological ROS: positive for - anxiety  ENT ROS: positive for - nasal congestion  Allergy and Immunology ROS: positive for - seasonal allergies  Hematological and Lymphatic ROS: positive for - bruising  Endocrine ROS: negative  Breast ROS: negative for breast lumps  Mammogram is scheduled  Respiratory ROS: no cough, shortness of breath, or wheezing  Cardiovascular ROS: positive for - murmur and MVP  Gastrointestinal ROS: positive for - abdominal pain, diarrhea and from IBS, colonoscopy is current  Genito-Urinary ROS: positive for - hematuria  Musculoskeletal ROS: negative  Neurological ROS: no TIA or stroke symptoms  Dermatological ROS: negative          Objective   /60   Ht 157.5 cm (62\")   Wt 47.6 kg (105 lb)   LMP 01/28/2006 (Approximate)   BMI 19.20 kg/m²     General:  well developed; well nourished  no acute distress   Skin:  No suspicious lesions seen   Thyroid: normal to inspection and palpation   Breasts:  Examined in supine position  Symmetric without masses or skin dimpling  Nipples normal without inversion, lesions or discharge  There are no palpable axillary nodes   Abdomen: soft, non-tender; no masses  no umbilical or inguinal hernias are present  no hepato-splenomegaly   Heart: regular rate and rhythm, S1, S2 normal, no murmur, click, rub or gallop   Lungs: clear to auscultation   Pelvis: Clinical staff was present for exam  External genitalia:  normal appearance of the external genitalia including Bartholin's and [place]'s glands.  :  urethral meatus normal;  Vaginal:  atrophic mucosal changes are present;  Cervix:  normal appearance. Dark brownish discharge noted on the cervix  Uterus:  normal size, shape and consistency.  Adnexa:  normal bimanual exam of the adnexa.  Rectal:  digital rectal exam not performed; anus visually normal appearing.          Assessment/Plan   Kelly was seen today for gynecologic exam.    Diagnoses and all orders for this visit:    Well woman " exam  -     Liquid-based Pap Smear, Screening    Postmenopausal bleeding  -     US Non-ob Transvaginal    Endometrial polyp         Myosure D/C for endometrial polyp  Patient has an appointment with urology on 2/1/2019  She will call after urology appointment to schedule the above procedure    This note was electronically signed.    Loy Keenan MD   January 28, 2019

## 2019-10-04 ENCOUNTER — OFFICE VISIT (OUTPATIENT)
Dept: OBSTETRICS AND GYNECOLOGY | Facility: CLINIC | Age: 69
End: 2019-10-04

## 2019-10-04 VITALS
DIASTOLIC BLOOD PRESSURE: 62 MMHG | WEIGHT: 106 LBS | HEIGHT: 62 IN | BODY MASS INDEX: 19.51 KG/M2 | SYSTOLIC BLOOD PRESSURE: 110 MMHG

## 2019-10-04 DIAGNOSIS — Z87.42 HISTORY OF ABNORMAL UTERINE BLEEDING: Primary | ICD-10-CM

## 2019-10-04 PROCEDURE — 99213 OFFICE O/P EST LOW 20 MIN: CPT | Performed by: OBSTETRICS & GYNECOLOGY

## 2019-10-04 NOTE — PROGRESS NOTES
Subjective   Kelly Waldrop is a 69 y.o. female is here today for follow-up.    Chief Complaint   Patient presents with   • Postpartum Follow-up     gyn follow up        History of Present Illness  Patient was seen here in January for an annual exam.  Brownish discharge was noted on the pelvic exam.  Ultrasound revealed a thin endometrial stripe with questionable endometrial polyp.  Patient has had no vaginal bleeding since then.  She is now following up for this due to multiple urological procedures for kidney stone.  Patient is now doing well from her surgeries.  The following portions of the patient's history were reviewed and updated as appropriate: allergies, current medications, past family history, past medical history, past social history, past surgical history and problem list.    Review of Systems - Negative except present    Objective   General:  well developed; well nourished  no acute distress   Skin:  Not performed.   Thyroid: not examined   Breasts:  Not performed.   Abdomen: Not performed.   Heart: regular rate and rhythm, S1, S2 normal, no murmur, click, rub or gallop   Lungs: clear to auscultation   Pelvis: Not performed.         Assessment/Plan   Kelly was seen today for postpartum follow-up.    Diagnoses and all orders for this visit:    History of abnormal uterine bleeding  -     US Non-ob Transvaginal      Ultrasound shows a thickened endometrium with a questionable endometrial polyp  We will discuss patient with Dr. Cee  Monitor vaginal bleeding if it occurs and call the office  Return in 1 month    Loy Keenan MD

## 2019-11-04 ENCOUNTER — OFFICE VISIT (OUTPATIENT)
Dept: OBSTETRICS AND GYNECOLOGY | Facility: CLINIC | Age: 69
End: 2019-11-04

## 2019-11-04 VITALS
WEIGHT: 106.4 LBS | HEIGHT: 62 IN | SYSTOLIC BLOOD PRESSURE: 120 MMHG | BODY MASS INDEX: 19.58 KG/M2 | DIASTOLIC BLOOD PRESSURE: 58 MMHG

## 2019-11-04 DIAGNOSIS — N84.0 ENDOMETRIAL POLYP: Primary | ICD-10-CM

## 2019-11-04 PROCEDURE — 99212 OFFICE O/P EST SF 10 MIN: CPT | Performed by: OBSTETRICS & GYNECOLOGY

## 2019-11-04 RX ORDER — TAMSULOSIN HYDROCHLORIDE 0.4 MG/1
CAPSULE ORAL
COMMUNITY
End: 2022-02-21

## 2019-11-04 RX ORDER — PHENAZOPYRIDINE HYDROCHLORIDE 200 MG/1
TABLET, FILM COATED ORAL
COMMUNITY
End: 2022-02-21

## 2019-11-04 RX ORDER — ZOLPIDEM TARTRATE 10 MG/1
TABLET ORAL
COMMUNITY
End: 2019-11-04 | Stop reason: SDUPTHER

## 2019-11-04 RX ORDER — HYDROCODONE BITARTRATE AND ACETAMINOPHEN 5; 325 MG/1; MG/1
TABLET ORAL
COMMUNITY
End: 2022-02-21

## 2019-11-04 RX ORDER — AZELASTINE HYDROCHLORIDE, FLUTICASONE PROPIONATE 137; 50 UG/1; UG/1
SPRAY, METERED NASAL EVERY 12 HOURS SCHEDULED
COMMUNITY
End: 2022-02-21

## 2019-11-04 NOTE — PROGRESS NOTES
Subjective   Kelly Waldrop is a 69 y.o. female is here today for follow-up.    Chief Complaint   Patient presents with   • Follow-up     f/u        History of Present Illness  Patient had a history of blood and urine.  She is undergone a work-up and found to have multiple kidney stones.  She underwent 3 operations in February and March of this year to remove the kidney stones.  Patient has had no bleeding since then.  She had an ultrasound in January which showed a questionable endometrial polyp and repeat ultrasound in October which showed basically the same.  The patient has had no bleeding.  The endometrial lining is very thin and the possible polyp measured 2 mm x 4 mm.  Patient has worn tampons and has had no bleeding on these are pantiliners.  She will return in 6 months for repeat ultrasound.    The following portions of the patient's history were reviewed and updated as appropriate: allergies, current medications, past family history, past medical history, past social history, past surgical history and problem list.    Review of Systems - History obtained from the patient  General ROS: negative  Psychological ROS: negative  ENT ROS: negative  Allergy and Immunology ROS: positive for - seasonal allergies  Hematological and Lymphatic ROS: negative  Endocrine ROS: negative  Breast ROS: negative for breast lumps  Mammogram is current  Respiratory ROS: no cough, shortness of breath, or wheezing  Cardiovascular ROS: no chest pain or dyspnea on exertion  Gastrointestinal ROS: no abdominal pain, change in bowel habits, or black or bloody stools  Patient needs to schedule a colonoscopy since his been 10 years  Genito-Urinary ROS: no dysuria, trouble voiding, or hematuria  Musculoskeletal ROS: negative  Neurological ROS: no TIA or stroke symptoms  Dermatological ROS: negative     Objective   General:  well developed; well nourished  no acute distress   Skin:  Not performed.   Thyroid: not examined   Breasts:  Not  performed.   Abdomen: Not performed.   Heart: regular rate and rhythm, S1, S2 normal, no murmur, click, rub or gallop   Lungs: clear to auscultation   Pelvis: Not performed.         Assessment/Plan   Kelly was seen today for follow-up.    Diagnoses and all orders for this visit:    Endometrial polyp      Continue to monitor vaginal bleeding call if this occurs  Return in 6 months    Loy Keenan MD

## 2021-10-11 ENCOUNTER — TRANSCRIBE ORDERS (OUTPATIENT)
Dept: LAB | Facility: HOSPITAL | Age: 71
End: 2021-10-11

## 2021-10-11 ENCOUNTER — LAB (OUTPATIENT)
Dept: LAB | Facility: HOSPITAL | Age: 71
End: 2021-10-11

## 2021-10-11 DIAGNOSIS — J18.9 UNRESOLVED PNEUMONIA: Primary | ICD-10-CM

## 2021-10-11 DIAGNOSIS — J18.9 UNRESOLVED PNEUMONIA: ICD-10-CM

## 2021-10-11 LAB
ALBUMIN SERPL-MCNC: 3.1 G/DL (ref 3.5–5.2)
ALBUMIN/GLOB SERPL: 0.8 G/DL
ALP SERPL-CCNC: 112 U/L (ref 39–117)
ALT SERPL W P-5'-P-CCNC: 30 U/L (ref 1–33)
ANION GAP SERPL CALCULATED.3IONS-SCNC: 11 MMOL/L (ref 5–15)
AST SERPL-CCNC: 24 U/L (ref 1–32)
BASOPHILS # BLD AUTO: 0.09 10*3/MM3 (ref 0–0.2)
BASOPHILS NFR BLD AUTO: 1 % (ref 0–1.5)
BILIRUB SERPL-MCNC: <0.2 MG/DL (ref 0–1.2)
BUN SERPL-MCNC: 14 MG/DL (ref 8–23)
BUN/CREAT SERPL: 21.2 (ref 7–25)
CALCIUM SPEC-SCNC: 9 MG/DL (ref 8.6–10.5)
CHLORIDE SERPL-SCNC: 100 MMOL/L (ref 98–107)
CO2 SERPL-SCNC: 28 MMOL/L (ref 22–29)
CREAT SERPL-MCNC: 0.66 MG/DL (ref 0.57–1)
CRP SERPL-MCNC: 1.78 MG/DL (ref 0–0.5)
DEPRECATED RDW RBC AUTO: 47.9 FL (ref 37–54)
EOSINOPHIL # BLD AUTO: 0.25 10*3/MM3 (ref 0–0.4)
EOSINOPHIL NFR BLD AUTO: 2.9 % (ref 0.3–6.2)
ERYTHROCYTE [DISTWIDTH] IN BLOOD BY AUTOMATED COUNT: 13.7 % (ref 12.3–15.4)
ERYTHROCYTE [SEDIMENTATION RATE] IN BLOOD: 77 MM/HR (ref 0–30)
GFR SERPL CREATININE-BSD FRML MDRD: 88 ML/MIN/1.73
GLOBULIN UR ELPH-MCNC: 3.7 GM/DL
GLUCOSE SERPL-MCNC: 136 MG/DL (ref 65–99)
HCT VFR BLD AUTO: 34.9 % (ref 34–46.6)
HGB BLD-MCNC: 10.8 G/DL (ref 12–15.9)
IMM GRANULOCYTES # BLD AUTO: 0.02 10*3/MM3 (ref 0–0.05)
IMM GRANULOCYTES NFR BLD AUTO: 0.2 % (ref 0–0.5)
LYMPHOCYTES # BLD AUTO: 1.35 10*3/MM3 (ref 0.7–3.1)
LYMPHOCYTES NFR BLD AUTO: 15.7 % (ref 19.6–45.3)
MCH RBC QN AUTO: 29.4 PG (ref 26.6–33)
MCHC RBC AUTO-ENTMCNC: 30.9 G/DL (ref 31.5–35.7)
MCV RBC AUTO: 95.1 FL (ref 79–97)
MONOCYTES # BLD AUTO: 0.68 10*3/MM3 (ref 0.1–0.9)
MONOCYTES NFR BLD AUTO: 7.9 % (ref 5–12)
NEUTROPHILS NFR BLD AUTO: 6.2 10*3/MM3 (ref 1.7–7)
NEUTROPHILS NFR BLD AUTO: 72.3 % (ref 42.7–76)
NRBC BLD AUTO-RTO: 0 /100 WBC (ref 0–0.2)
PLATELET # BLD AUTO: 440 10*3/MM3 (ref 140–450)
PMV BLD AUTO: 9.2 FL (ref 6–12)
POTASSIUM SERPL-SCNC: 3.7 MMOL/L (ref 3.5–5.2)
PROT SERPL-MCNC: 6.8 G/DL (ref 6–8.5)
RBC # BLD AUTO: 3.67 10*6/MM3 (ref 3.77–5.28)
SODIUM SERPL-SCNC: 139 MMOL/L (ref 136–145)
WBC # BLD AUTO: 8.59 10*3/MM3 (ref 3.4–10.8)

## 2021-10-11 PROCEDURE — 85652 RBC SED RATE AUTOMATED: CPT

## 2021-10-11 PROCEDURE — 86140 C-REACTIVE PROTEIN: CPT

## 2021-10-11 PROCEDURE — 80053 COMPREHEN METABOLIC PANEL: CPT

## 2021-10-11 PROCEDURE — 85025 COMPLETE CBC W/AUTO DIFF WBC: CPT

## 2021-10-11 PROCEDURE — 36415 COLL VENOUS BLD VENIPUNCTURE: CPT

## 2021-10-14 ENCOUNTER — TELEPHONE (OUTPATIENT)
Dept: INTERNAL MEDICINE | Facility: CLINIC | Age: 71
End: 2021-10-14

## 2022-02-21 ENCOUNTER — OFFICE VISIT (OUTPATIENT)
Dept: INTERNAL MEDICINE | Facility: CLINIC | Age: 72
End: 2022-02-21

## 2022-02-21 VITALS
BODY MASS INDEX: 19.22 KG/M2 | TEMPERATURE: 98.2 F | HEART RATE: 94 BPM | SYSTOLIC BLOOD PRESSURE: 118 MMHG | WEIGHT: 101.8 LBS | HEIGHT: 61 IN | DIASTOLIC BLOOD PRESSURE: 60 MMHG

## 2022-02-21 DIAGNOSIS — Z11.59 ENCOUNTER FOR HEPATITIS C SCREENING TEST FOR LOW RISK PATIENT: ICD-10-CM

## 2022-02-21 DIAGNOSIS — Z23 NEED FOR VACCINATION: ICD-10-CM

## 2022-02-21 DIAGNOSIS — K58.9 IRRITABLE BOWEL SYNDROME WITHOUT DIARRHEA: ICD-10-CM

## 2022-02-21 DIAGNOSIS — R73.01 IMPAIRED FASTING GLUCOSE: ICD-10-CM

## 2022-02-21 DIAGNOSIS — E46 PROTEIN-CALORIE MALNUTRITION, UNSPECIFIED SEVERITY: ICD-10-CM

## 2022-02-21 DIAGNOSIS — N20.0 RENAL STONE: Primary | ICD-10-CM

## 2022-02-21 DIAGNOSIS — E78.5 HYPERLIPIDEMIA LDL GOAL <100: ICD-10-CM

## 2022-02-21 DIAGNOSIS — M81.0 OSTEOPOROSIS OF VERTEBRA: ICD-10-CM

## 2022-02-21 DIAGNOSIS — Z12.11 SCREEN FOR COLON CANCER: ICD-10-CM

## 2022-02-21 PROBLEM — I49.40 PREMATURE HEARTBEATS: Status: ACTIVE | Noted: 2022-02-21

## 2022-02-21 PROCEDURE — 90732 PPSV23 VACC 2 YRS+ SUBQ/IM: CPT | Performed by: INTERNAL MEDICINE

## 2022-02-21 PROCEDURE — G0009 ADMIN PNEUMOCOCCAL VACCINE: HCPCS | Performed by: INTERNAL MEDICINE

## 2022-02-21 PROCEDURE — 99205 OFFICE O/P NEW HI 60 MIN: CPT | Performed by: INTERNAL MEDICINE

## 2022-02-21 RX ORDER — DIPHENOXYLATE HYDROCHLORIDE AND ATROPINE SULFATE 2.5; .025 MG/1; MG/1
1 TABLET ORAL DAILY
COMMUNITY

## 2022-02-21 RX ORDER — B-COMPLEX WITH VITAMIN C
0.5 TABLET ORAL 3 TIMES DAILY
COMMUNITY

## 2022-02-21 NOTE — PROGRESS NOTES
Chief Complaint   Patient presents with   • Establish Care   • Pneumonia   • Hyperglycemia   • Hyperlipidemia   Counseling was given to patient and family for the following topics: diagnostic results, instructions for management, risk factor reductions, prognosis, patient and family education, impressions, risks and benefits of treatment options and importance of treatment compliance . Total time of the encounter was 70 minutes and 55 minutes was spend counseling.      History of Present Illness  71 y.o. white female presents for with history of mild hyperglycemia. She had complicated pneumonia Fall 2021 She has osteoporosis and took forteo but intolerant of bisphonates. She has has troble with irritable bowel and bloating and diarrhea.     Review of Systems   Constitutional: Negative for chills and fever.   Eyes: Negative for visual disturbance.   Respiratory: Negative for cough and shortness of breath.    Cardiovascular: Negative for chest pain and palpitations.   Gastrointestinal: Positive for abdominal distention (occasional bloating ) and diarrhea (occasional diarrhea and sometimes constipation ). Negative for abdominal pain, blood in stool, nausea and vomiting.   Skin: Negative for rash.   Neurological: Negative for dizziness, light-headedness and headaches.   Psychiatric/Behavioral: Negative for decreased concentration and dysphoric mood. The patient is nervous/anxious.    All other systems reviewed and are negative.    .    PMSFH:  The following portions of the patient's history were reviewed and updated as appropriate: allergies, current medications, past family history, past medical history, past social history, past surgical history and problem list.      Current Outpatient Medications:   •  B Complex Vitamins (Vitamin B Complex) tablet, Take 0.5 tablets by mouth 3 (Three) Times a Day., Disp: , Rfl:   •  busPIRone (BUSPAR) 15 MG tablet, Take 15 mg by mouth 2 (Two) Times a Day. Pt states she takes 1/3 of a  "15 mg tab bid, Disp: , Rfl:   •  CALCIUM CITRATE PO, Take 1 tablet by mouth Daily., Disp: , Rfl:   •  Cholecalciferol (Vitamin D3) 25 MCG (1000 UT) capsule, Take 1,000 Units by mouth Daily., Disp: , Rfl:   •  estradiol (ESTRACE) 0.1 MG/GM vaginal cream, Insert 2 g into the vagina 2 (Two) Times a Week., Disp: , Rfl:   •  fluticasone (CUTIVATE) 0.05 % cream, Apply 1 application topically to the appropriate area as directed Daily As Needed., Disp: , Rfl:   •  multivitamin (MULTIPLE VITAMIN PO), Take 1 tablet by mouth Daily. Delphine, Disp: , Rfl:   •  oxazepam (SERAX) 10 MG capsule, Take 10 mg by mouth 3 (Three) Times a Day. 1 q am,  1 q afternoon, 2 qhs, Disp: , Rfl:   •  PARoxetine (PAXIL) 10 MG tablet, Take 10 mg by mouth Every Morning. Take 1/4 every other day, Disp: , Rfl:   •  VITAMIN E PO, Take 1 tablet by mouth 2 (Two) Times a Day., Disp: , Rfl:   •  zolpidem (AMBIEN) 10 MG tablet, Take 10 mg by mouth At Night As Needed., Disp: , Rfl:     VITALS:  /60 (BP Location: Left arm, Patient Position: Sitting, Cuff Size: Adult)   Pulse 94   Temp 98.2 °F (36.8 °C)   Ht 155.6 cm (61.25\")   Wt 46.2 kg (101 lb 12.8 oz)   LMP 01/28/2006 (Approximate)   BMI 19.08 kg/m²     Physical Exam  Vitals and nursing note reviewed.   Constitutional:       Appearance: Normal appearance. She is well-developed.   HENT:      Head: Normocephalic.      Right Ear: Tympanic membrane and ear canal normal.      Left Ear: Tympanic membrane and ear canal normal.   Eyes:      Extraocular Movements: Extraocular movements intact.      Conjunctiva/sclera: Conjunctivae normal.   Cardiovascular:      Rate and Rhythm: Normal rate and regular rhythm.      Heart sounds: Normal heart sounds.   Pulmonary:      Effort: Pulmonary effort is normal. No respiratory distress.      Breath sounds: Normal breath sounds.   Abdominal:      General: Bowel sounds are normal.      Palpations: Abdomen is soft.      Tenderness: There is no abdominal tenderness. "   Skin:     General: Skin is warm and dry.   Neurological:      General: No focal deficit present.      Mental Status: She is alert and oriented to person, place, and time.   Psychiatric:         Mood and Affect: Mood normal.         Behavior: Behavior normal.         LABS:      Procedures         ASSESSMENT/PLAN:  Problems Addressed this Visit        Cardiac and Vasculature    Hyperlipidemia LDL goal <100     Lipid abnormalities are improving with lifestyle modifications.  Nutritional counseling was provided.  Lipids will be reassessed in 6 months.         Relevant Orders    CBC & Differential    Comprehensive Metabolic Panel    Lipid Panel    TSH Rfx On Abnormal To Free T4    High Sensitivity CRP    Microalbumin / Creatinine Urine Ratio - Urine, Clean Catch       Endocrine and Metabolic    Impaired fasting glucose     Discussed decreasing bad carbohydrates, specifically sweets, breads, potatoes, corn and high caloric drinks (juices, sodas, sweet tea).  Also recommend increasing physical activity, ideally 150 minutes aerobic exercise weekly and resistance exercises 2-3x/week.         Relevant Orders    Hemoglobin A1c       Gastrointestinal Abdominal     Irritable bowel syndrome without diarrhea     Counseled on diet and adding psyllium gradually and counseled patient regarding multimodal approach with healthy nutrition, healthy sleep, regular physical activity, social activities, and meditation and praying               Genitourinary and Reproductive     Renal stone - Primary     She does follow with Dr Lui and encouraged hydration.             Musculoskeletal and Injuries    Osteoporosis of vertebra     She took forteo in past and intolerant of bisphosphonate's and will likely get dexa at [facility]ness exam. Check labs and encouraged to take 1600 to 2000 IU of vitamin D 3 and at least 600 mg of calcium and get regular walking          Relevant Orders    Vitamin D 25 Hydroxy      Other Visit Diagnoses     Screen for  colon cancer        Get colonoscopy with Dr Steel    Relevant Orders    Ambulatory Referral For Screening Colonoscopy (Completed)    Encounter for hepatitis C screening test for low risk patient        Relevant Orders    Hepatitis C Antibody    Need for vaccination        Relevant Orders    Pneumococcal Polysaccharide Vaccine 23-Valent Greater Than or Equal To 3yo Subcutaneous / IM (Completed)    Protein-calorie malnutrition, unspecified severity (HCC)        Lots weight and nutrtion with recent 1 month hospitalization. proceed with labs to establish healthiness.     Relevant Orders    Prealbumin      Diagnoses       Codes Comments    Renal stone    -  Primary ICD-10-CM: N20.0  ICD-9-CM: 592.0     Impaired fasting glucose     ICD-10-CM: R73.01  ICD-9-CM: 790.21     Hyperlipidemia LDL goal <100     ICD-10-CM: E78.5  ICD-9-CM: 272.4     Irritable bowel syndrome without diarrhea     ICD-10-CM: K58.9  ICD-9-CM: 564.1     Osteoporosis of vertebra     ICD-10-CM: M81.0  ICD-9-CM: 733.00     Screen for colon cancer     ICD-10-CM: Z12.11  ICD-9-CM: V76.51 Get colonoscopy with Dr Steel    Encounter for hepatitis C screening test for low risk patient     ICD-10-CM: Z11.59  ICD-9-CM: V73.89     Need for vaccination     ICD-10-CM: Z23  ICD-9-CM: V05.9     Protein-calorie malnutrition, unspecified severity (HCC)     ICD-10-CM: E46  ICD-9-CM: 263.9 Lots weight and nutrtion with recent 1 month hospitalization. proceed with labs to establish healthiness.           FOLLOW-UP:  Return in about 6 months (around 8/21/2022).      Electronically signed by:    Hernan Hoover MD

## 2022-02-22 NOTE — ASSESSMENT & PLAN NOTE
She took forteo in past and intolerant of bisphosphonate's and will likely get dexa at Wellness exam. Check labs and encouraged to take 1600 to 2000 IU of vitamin D 3 and at least 600 mg of calcium and get regular walking

## 2022-02-22 NOTE — ASSESSMENT & PLAN NOTE
Counseled on diet and adding psyllium gradually and counseled patient regarding multimodal approach with healthy nutrition, healthy sleep, regular physical activity, social activities, and meditation and praying

## 2022-03-21 ENCOUNTER — OFFICE VISIT (OUTPATIENT)
Dept: OBSTETRICS AND GYNECOLOGY | Facility: CLINIC | Age: 72
End: 2022-03-21

## 2022-03-21 VITALS
WEIGHT: 101 LBS | SYSTOLIC BLOOD PRESSURE: 120 MMHG | DIASTOLIC BLOOD PRESSURE: 60 MMHG | BODY MASS INDEX: 18.58 KG/M2 | HEIGHT: 62 IN

## 2022-03-21 DIAGNOSIS — Z01.419 WELL WOMAN EXAM WITH ROUTINE GYNECOLOGICAL EXAM: ICD-10-CM

## 2022-03-21 DIAGNOSIS — N95.2 ATROPHIC VAGINITIS: ICD-10-CM

## 2022-03-21 DIAGNOSIS — Z12.31 ENCOUNTER FOR SCREENING MAMMOGRAM FOR BREAST CANCER: Primary | ICD-10-CM

## 2022-03-21 DIAGNOSIS — Z78.0 POSTMENOPAUSAL: ICD-10-CM

## 2022-03-21 PROCEDURE — G0101 CA SCREEN;PELVIC/BREAST EXAM: HCPCS | Performed by: STUDENT IN AN ORGANIZED HEALTH CARE EDUCATION/TRAINING PROGRAM

## 2022-03-21 NOTE — PROGRESS NOTES
"Subjective   Chief Complaint   Patient presents with   • Gynecologic Exam   • Establish Care     No c/c today     Kelly Waldrop is a 71 y.o. year old  menopausal female presenting to be seen for her annual exam.  Patient is a participant in the [place] ovarian cancer screening program. She goes yearly for an ultrasound, last visit 2022.     Mammogram: due this year, order placed today   Colonoscopy: will be seeing a GI this year.   DEXA: PCP is currently working her up for this and will order in the next 6 months.     This past year she has not been on hormone replacement therapy, but is using vaginal estrogen cream. She has been having vaginal rritation over the last 2-3 weeks and has been decreasing usage.  She has not had any vaginal bleeding in the last 12 months.  Menopausal symptoms are not present.    SEXUAL Hx:  She is currently sexually active.  She would not like to be screened for STD's at today's exam.  Cruger is painful: no  HEALTH Hx:  She exercises regularly: yes. Walking and total gym.   She wears her seat belt: yes.  She has concerns about domestic violence: no.  She has noticed changes in height: no.  OTHER THINGS SHE WANTS TO DISCUSS TODAY:  Nothing else    The following portions of the patient's history were reviewed and updated as appropriate:problem list, current medications, allergies, past family history, past medical history, past social history and past surgical history.    Social History    Tobacco Use      Smoking status: Never Smoker      Smokeless tobacco: Never Used         Objective   /60 (BP Location: Left arm, Patient Position: Sitting, Cuff Size: Adult)   Ht 157.5 cm (62\")   Wt 45.8 kg (101 lb)   LMP 2006 (Approximate)   Breastfeeding No   BMI 18.47 kg/m²     General:  well developed; well nourished  no acute distress   Breasts:  Examined in supine position  Symmetric without masses or skin dimpling  Nipples normal without inversion, lesions or " discharge  There are no palpable axillary nodes   Pelvis: Clinical staff was present for exam  External genitalia:  normal appearance of the external genitalia including Bartholin's and Summerset's glands.  :  urethral meatus normal;  Vaginal:  atrophic mucosal changes are present;  Cervix:  normal appearance. Pale and stenotic  Uterus:  normal size, shape and consistency.  Adnexa:  normal bimanual exam of the adnexa.  Rectal:  digital rectal exam not performed; anus visually normal appearing.          Assessment   1. Normal postmenopausal exam  2. Atrophic vaginitis  3. Menopausal female currently not on HRT - without significant symptoms affecting activities of daily living  4. She is up to date on all relevant gynecologicscreenings except mammography and colon cancer screening     Plan   1. Pap was not done today.  I explained to Kelly that the Pap smears are no longer recommended in patient's after 65 years of age.   I stressed to Kelly that she still should be seen to be seen yearly for a full physical including breast and pelvic exam.  2. She was encouraged to get yearly mammograms, order placed today.  She should report any palpable breast lump(s) or skin changes regardless of mammographic findings.  I explained to Kelly that notification regarding her mammogram results will come from the center performing the study.  Our office will not be routinely calling with mammogram results.  It is her responsibility to make sure that the results from the mammogram are communicated to her by the breast center.  If she has any questions about the results, she is welcome to call our office anytime.  3. Bone density testing was recommended.  I reviewed with Kelly that it was always most advisable for all bone density tests for each patient to be done on the same machine over time.  The purpose of this is to improve the accuracy of the interpretation of serial studies.  4. Recommend alternating nightly estrogen  cream every other night with Aquaphor to help with vaginal irritation. Continue premarin cream prescribed by Urology, sample given in clinic today.   5. The importance of keeping all planned follow-up and taking all medications as prescribed was emphasized.  6. Follow up for annual exam in 1-2 years.     No orders of the defined types were placed in this encounter.         This note was electronically signed.    Rama Reed MD  March 21, 2022

## 2022-06-09 NOTE — TELEPHONE ENCOUNTER
Caller: Koko Waldrop    Relationship: SPOUSE    Best call back number:     What is the best time to reach you: ANYTIME    Who are you requesting to speak with (clinical staff, provider,  specific staff member): DR MARX/CLINICAL STAFF    Do you know the name of the person who called: KOKO    What was the call regarding: PATIENTS SPOUSE  CALLED IN AND WAS ASKING IF PATIENT AND HIMSELF COULD BECOME PATIENTS OF DR MARX; I ADVISED HE WASN'T SEEING NEW PATIENTS BUT I WOULD GLADLY SEND A MESSAGE TO SEE IF THEIR COULD BE AN EXCEPTION MADE; THEY ARE CURRENTLY UNHAPPY WITH THEIR CURRENT PCP AND THE DR MARX CAME HIGHLY RECOMMENED BY DR DR ODESSA CALL, PEDIATRICS.       Do you require a callback: YES           
Yes for jan or feb   
detailed exam

## 2022-08-15 ENCOUNTER — TELEPHONE (OUTPATIENT)
Dept: INTERNAL MEDICINE | Facility: CLINIC | Age: 72
End: 2022-08-15

## 2022-08-15 NOTE — TELEPHONE ENCOUNTER
Caller: Koko Waldrop    Relationship: Emergency Contact,     Best call back number: [phone] (BEST)    What was the call regarding: PLEASE ADD VITAMIN D TO THE LAB ORDERS. CALL PATIENT TO ALERT IF FASTING    Do you require a callback: YES, PLEASE    WANTS TO HAVE DRAWN IN THE MORNING- PLEASE CALL TODAY TO ADVISE- LEAVE A DETAILED MESSAGE IF NO ANSWER

## 2022-08-16 ENCOUNTER — LAB (OUTPATIENT)
Dept: LAB | Facility: HOSPITAL | Age: 72
End: 2022-08-16

## 2022-08-16 DIAGNOSIS — Z11.59 ENCOUNTER FOR HEPATITIS C SCREENING TEST FOR LOW RISK PATIENT: ICD-10-CM

## 2022-08-16 DIAGNOSIS — E78.5 HYPERLIPIDEMIA LDL GOAL <100: ICD-10-CM

## 2022-08-16 DIAGNOSIS — M81.0 OSTEOPOROSIS OF VERTEBRA: ICD-10-CM

## 2022-08-16 DIAGNOSIS — E46 PROTEIN-CALORIE MALNUTRITION, UNSPECIFIED SEVERITY: ICD-10-CM

## 2022-08-16 DIAGNOSIS — R73.01 IMPAIRED FASTING GLUCOSE: ICD-10-CM

## 2022-08-16 LAB
25(OH)D3 SERPL-MCNC: 56.1 NG/ML (ref 30–100)
ALBUMIN SERPL-MCNC: 4.4 G/DL (ref 3.5–5.2)
ALBUMIN UR-MCNC: 2 MG/DL
ALBUMIN/GLOB SERPL: 1.8 G/DL
ALP SERPL-CCNC: 80 U/L (ref 39–117)
ALT SERPL W P-5'-P-CCNC: 23 U/L (ref 1–33)
ANION GAP SERPL CALCULATED.3IONS-SCNC: 9.3 MMOL/L (ref 5–15)
AST SERPL-CCNC: 26 U/L (ref 1–32)
BASOPHILS # BLD AUTO: 0.05 10*3/MM3 (ref 0–0.2)
BASOPHILS NFR BLD AUTO: 1.1 % (ref 0–1.5)
BILIRUB SERPL-MCNC: 0.5 MG/DL (ref 0–1.2)
BUN SERPL-MCNC: 15 MG/DL (ref 8–23)
BUN/CREAT SERPL: 22.7 (ref 7–25)
CALCIUM SPEC-SCNC: 9.2 MG/DL (ref 8.6–10.5)
CHLORIDE SERPL-SCNC: 101 MMOL/L (ref 98–107)
CHOLEST SERPL-MCNC: 206 MG/DL (ref 0–200)
CO2 SERPL-SCNC: 28.7 MMOL/L (ref 22–29)
CREAT SERPL-MCNC: 0.66 MG/DL (ref 0.57–1)
CREAT UR-MCNC: 18.5 MG/DL
CRP SERPL-MCNC: 0.09 MG/DL (ref 0.01–0.5)
DEPRECATED RDW RBC AUTO: 40.4 FL (ref 37–54)
EGFRCR SERPLBLD CKD-EPI 2021: 93.9 ML/MIN/1.73
EOSINOPHIL # BLD AUTO: 0.27 10*3/MM3 (ref 0–0.4)
EOSINOPHIL NFR BLD AUTO: 5.7 % (ref 0.3–6.2)
ERYTHROCYTE [DISTWIDTH] IN BLOOD BY AUTOMATED COUNT: 12.3 % (ref 12.3–15.4)
GLOBULIN UR ELPH-MCNC: 2.5 GM/DL
GLUCOSE SERPL-MCNC: 98 MG/DL (ref 65–99)
HBA1C MFR BLD: 6.1 % (ref 4.8–5.6)
HCT VFR BLD AUTO: 42.3 % (ref 34–46.6)
HCV AB SER DONR QL: NORMAL
HDLC SERPL-MCNC: 73 MG/DL (ref 40–60)
HGB BLD-MCNC: 14.3 G/DL (ref 12–15.9)
IMM GRANULOCYTES # BLD AUTO: 0.01 10*3/MM3 (ref 0–0.05)
IMM GRANULOCYTES NFR BLD AUTO: 0.2 % (ref 0–0.5)
LDLC SERPL CALC-MCNC: 121 MG/DL (ref 0–100)
LDLC/HDLC SERPL: 1.63 {RATIO}
LYMPHOCYTES # BLD AUTO: 1.74 10*3/MM3 (ref 0.7–3.1)
LYMPHOCYTES NFR BLD AUTO: 36.9 % (ref 19.6–45.3)
MCH RBC QN AUTO: 30.9 PG (ref 26.6–33)
MCHC RBC AUTO-ENTMCNC: 33.8 G/DL (ref 31.5–35.7)
MCV RBC AUTO: 91.4 FL (ref 79–97)
MICROALBUMIN/CREAT UR: 108.1 MG/G
MONOCYTES # BLD AUTO: 0.48 10*3/MM3 (ref 0.1–0.9)
MONOCYTES NFR BLD AUTO: 10.2 % (ref 5–12)
NEUTROPHILS NFR BLD AUTO: 2.16 10*3/MM3 (ref 1.7–7)
NEUTROPHILS NFR BLD AUTO: 45.9 % (ref 42.7–76)
NRBC BLD AUTO-RTO: 0 /100 WBC (ref 0–0.2)
PLATELET # BLD AUTO: 207 10*3/MM3 (ref 140–450)
PMV BLD AUTO: 10.6 FL (ref 6–12)
POTASSIUM SERPL-SCNC: 3.8 MMOL/L (ref 3.5–5.2)
PREALB SERPL-MCNC: 23.1 MG/DL (ref 20–40)
PROT SERPL-MCNC: 6.9 G/DL (ref 6–8.5)
RBC # BLD AUTO: 4.63 10*6/MM3 (ref 3.77–5.28)
SODIUM SERPL-SCNC: 139 MMOL/L (ref 136–145)
TRIGL SERPL-MCNC: 69 MG/DL (ref 0–150)
TSH SERPL DL<=0.05 MIU/L-ACNC: 2 UIU/ML (ref 0.27–4.2)
VLDLC SERPL-MCNC: 12 MG/DL (ref 5–40)
WBC NRBC COR # BLD: 4.71 10*3/MM3 (ref 3.4–10.8)

## 2022-08-16 PROCEDURE — 85025 COMPLETE CBC W/AUTO DIFF WBC: CPT

## 2022-08-16 PROCEDURE — 82570 ASSAY OF URINE CREATININE: CPT

## 2022-08-16 PROCEDURE — 82043 UR ALBUMIN QUANTITATIVE: CPT

## 2022-08-16 PROCEDURE — 86803 HEPATITIS C AB TEST: CPT

## 2022-08-16 PROCEDURE — 82306 VITAMIN D 25 HYDROXY: CPT

## 2022-08-16 PROCEDURE — 86141 C-REACTIVE PROTEIN HS: CPT

## 2022-08-16 PROCEDURE — 84134 ASSAY OF PREALBUMIN: CPT

## 2022-08-16 PROCEDURE — 84443 ASSAY THYROID STIM HORMONE: CPT

## 2022-08-16 PROCEDURE — 80061 LIPID PANEL: CPT

## 2022-08-16 PROCEDURE — 83036 HEMOGLOBIN GLYCOSYLATED A1C: CPT

## 2022-08-16 PROCEDURE — 80053 COMPREHEN METABOLIC PANEL: CPT

## 2022-08-16 PROCEDURE — 36415 COLL VENOUS BLD VENIPUNCTURE: CPT

## 2022-08-22 ENCOUNTER — OFFICE VISIT (OUTPATIENT)
Dept: INTERNAL MEDICINE | Facility: CLINIC | Age: 72
End: 2022-08-22

## 2022-08-22 VITALS
SYSTOLIC BLOOD PRESSURE: 118 MMHG | HEART RATE: 84 BPM | TEMPERATURE: 98 F | BODY MASS INDEX: 18.95 KG/M2 | DIASTOLIC BLOOD PRESSURE: 68 MMHG | WEIGHT: 103 LBS | HEIGHT: 62 IN

## 2022-08-22 DIAGNOSIS — Z91.81 AT MODERATE RISK FOR FALL: ICD-10-CM

## 2022-08-22 DIAGNOSIS — E78.5 HYPERLIPIDEMIA LDL GOAL <100: ICD-10-CM

## 2022-08-22 DIAGNOSIS — F41.3 OTHER MIXED ANXIETY DISORDERS: ICD-10-CM

## 2022-08-22 DIAGNOSIS — Z00.00 MEDICARE ANNUAL WELLNESS VISIT, SUBSEQUENT: Primary | ICD-10-CM

## 2022-08-22 DIAGNOSIS — R73.01 IMPAIRED FASTING GLUCOSE: ICD-10-CM

## 2022-08-22 DIAGNOSIS — N20.0 RENAL STONE: ICD-10-CM

## 2022-08-22 DIAGNOSIS — M81.0 OSTEOPOROSIS OF VERTEBRA: ICD-10-CM

## 2022-08-22 PROCEDURE — G0439 PPPS, SUBSEQ VISIT: HCPCS | Performed by: INTERNAL MEDICINE

## 2022-08-22 PROCEDURE — 1159F MED LIST DOCD IN RCRD: CPT | Performed by: INTERNAL MEDICINE

## 2022-08-22 PROCEDURE — 1170F FXNL STATUS ASSESSED: CPT | Performed by: INTERNAL MEDICINE

## 2022-08-22 PROCEDURE — 99213 OFFICE O/P EST LOW 20 MIN: CPT | Performed by: INTERNAL MEDICINE

## 2022-08-22 NOTE — ASSESSMENT & PLAN NOTE
Her mood is good overall and follows with Dr Doan follows with him for her anxiety. Her recall was good 3 of 3 and good clock. She will see [facility] this Fall. She has mild hearing loss but not bad enough for hearing aides and overall does well with hearing. .Follow with Dr Hovoer for ENT.She had a screening for colon cancer per Dr Steel 5/2022 and will need colonoscopy October 2023 Age-appropriate Counseling:  Discussed preventative medicine issues with patient including regular exercise, healthy diet, stress reduction, adequate sleep and recommended age-appropriate screening studies.  Immunizations reviewed.

## 2022-08-22 NOTE — ASSESSMENT & PLAN NOTE
Acute issue and elevated and will  improving diet and exercise. Specifically, decrease saturated fats and trans fats and avoid bad carbohydrates (sweet, breads , potatoes, and high caloric drinks).  Also aim for 150 minutes aerobic exercise weekly and resistance exercises 2-3x/week. Add psyliium

## 2022-08-22 NOTE — ASSESSMENT & PLAN NOTE
She is aware she is over due for dexa scan but is afraid to take any of the treatments. She has been intolerant of forteo or actonel.

## 2022-08-22 NOTE — ASSESSMENT & PLAN NOTE
Encourage to get up slowly and get bearings before taking first step. Keep home well lit with clear floors to avoid tripping. Use assist devices for all walking especially from bed to bathroom. Proceed with [facility]

## 2022-08-22 NOTE — ASSESSMENT & PLAN NOTE
Acute issue and will reduce bad carbs including melons. Discussed decreasing bad carbohydrates, specifically sweets, breads, potatoes, corn and high caloric drinks (juices, sodas, sweet tea).  Also recommend increasing physical activity, ideally 150 minutes aerobic exercise weekly and resistance exercises 2-3x/week. She will add psyllium

## 2022-08-22 NOTE — PROGRESS NOTES
QUICK REFERENCE INFORMATION:  The ABCs of the Annual Wellness Visit    Subsequent Medicare Wellness Visit    HEALTH RISK ASSESSMENT    1950    Recent Hospitalizations:  No hospitalization(s) within the last year..        Current Medical Providers:  Patient Care Team:  Hernan Hoover MD as PCP - General (Internal Medicine)        Smoking Status:  Social History     Tobacco Use   Smoking Status Never Smoker   Smokeless Tobacco Never Used       Alcohol Consumption:  Social History     Substance and Sexual Activity   Alcohol Use No       Depression Screen:   PHQ-2/PHQ-9 Depression Screening 8/22/2022   Retired PHQ-9 Total Score -   Retired Total Score -   Little Interest or Pleasure in Doing Things 0-->not at all   Feeling Down, Depressed or Hopeless 0-->not at all   PHQ-9: Brief Depression Severity Measure Score 0       Health Habits and Functional and Cognitive Screening:  Functional & Cognitive Status 8/22/2022   Do you have difficulty preparing food and eating? No   Do you have difficulty bathing yourself, getting dressed or grooming yourself? No   Do you have difficulty using the toilet? No   Do you have difficulty moving around from place to place? No   Do you have trouble with steps or getting out of a bed or a chair? No   Current Diet Well Balanced Diet   Dental Exam Up to date   Eye Exam Not up to date   Exercise (times per week) 7 times per week   Current Exercises Include Treadmill   Do you need help using the phone?  No   Are you deaf or do you have serious difficulty hearing?  No   Do you need help with transportation? No   Do you need help shopping? No   Do you need help preparing meals?  No   Do you need help with housework?  No   Do you need help with laundry? No   Do you need help taking your medications? No   Do you need help managing money? No   Do you ever drive or ride in a car without wearing a seat belt? No   Have you felt unusual stress, anger or loneliness in the last month? No   Who do  you live with? Spouse   If you need help, do you have trouble finding someone available to you? No   Have you been bothered in the last four weeks by sexual problems? No   Do you have difficulty concentrating, remembering or making decisions? No       Fall Risk Screen:  ALLIE Fall Risk Assessment was completed, and patient is at LOW risk for falls.Assessment completed on:8/22/2022    ACE III MINI        Does the patient have evidence of cognitive impairment? No    Aspirin use counseling: Does not need ASA (and currently is not on it)    Recent Lab Results:  CMP:  Lab Results   Component Value Date    BUN 15 08/16/2022    CREATININE 0.66 08/16/2022    EGFRIFNONA 88 10/11/2021    BCR 22.7 08/16/2022     08/16/2022    K 3.8 08/16/2022    CO2 28.7 08/16/2022    CALCIUM 9.2 08/16/2022    ALBUMIN 4.40 08/16/2022    BILITOT 0.5 08/16/2022    ALKPHOS 80 08/16/2022    AST 26 08/16/2022    ALT 23 08/16/2022     HbA1c:  Lab Results   Component Value Date    HGBA1C 6.10 (H) 08/16/2022     Microalbumin:  Lab Results   Component Value Date    MICROALBUR 2.0 08/16/2022     Lipid Panel  Lab Results   Component Value Date    CHOL 206 (H) 08/16/2022    TRIG 69 08/16/2022    HDL 73 (H) 08/16/2022     (H) 08/16/2022    AST 26 08/16/2022    ALT 23 08/16/2022       Visual Acuity:  No exam data present    Age-appropriate Screening Schedule:  Refer to the list below for future screening recommendations based on patient's age, sex and/or medical conditions. Orders for these recommended tests are listed in the plan section. The patient has been provided with a written plan.    Health Maintenance   Topic Date Due   • DXA SCAN  Never done   • TDAP/TD VACCINES (2 - Td or Tdap) 12/01/2017   • INFLUENZA VACCINE  10/01/2022   • LIPID PANEL  08/16/2023   • MAMMOGRAM  04/27/2024   • ZOSTER VACCINE  Completed        Subjective   History of Present Illness    Kelly Waldrop is a 71 y.o. female who presents for a Subsequent Wellness  Visit.    CHRONIC CONDITIONS    The following portions of the patient's history were reviewed and updated as appropriate: allergies, current medications, past family history, past medical history, past social history, past surgical history and problem list.    Outpatient Medications Prior to Visit   Medication Sig Dispense Refill   • B Complex Vitamins (Vitamin B Complex) tablet Take 0.5 tablets by mouth 3 (Three) Times a Day.     • busPIRone (BUSPAR) 15 MG tablet Take 15 mg by mouth 2 (Two) Times a Day. Pt states she takes 1/3 of a 15 mg tab bid     • CALCIUM CITRATE PO Take 1 tablet by mouth Daily.     • Cholecalciferol (Vitamin D3) 25 MCG (1000 UT) capsule Take 1,000 Units by mouth Daily.     • estradiol (ESTRACE) 0.1 MG/GM vaginal cream Insert 2 g into the vagina 2 (Two) Times a Week.     • fluticasone (CUTIVATE) 0.05 % cream Apply 1 application topically to the appropriate area as directed Daily As Needed.     • multivitamin (THERAGRAN) tablet tablet Take 1 tablet by mouth Daily. Delphine     • oxazepam (SERAX) 10 MG capsule Take 10 mg by mouth 3 (Three) Times a Day. 1 q am,  1 q afternoon, 2 qhs     • PARoxetine (PAXIL) 10 MG tablet Take 10 mg by mouth Every Morning. Take 1/4 every other day     • psyllium (METAMUCIL) 58.6 % packet Take 1 packet by mouth Daily.     • VITAMIN E PO Take 1 tablet by mouth 2 (Two) Times a Day.     • zolpidem (AMBIEN) 10 MG tablet Take 10 mg by mouth At Night As Needed.       No facility-administered medications prior to visit.       Patient Active Problem List   Diagnosis   • Dermatitis   • Anxiety disorder   • Urinary tract infectious disease   • Irritable bowel syndrome without diarrhea   • Renal stone   • Impaired fasting glucose   • Hyperlipidemia LDL goal <100   • Osteoporosis of vertebra   • Premature heartbeats   • Medicare annual wellness visit, subsequent   • At moderate risk for fall       Advance Care Planning:  ACP discussion was held with the patient during this visit.  Patient has an advance directive in EMR which is still valid.     Identification of Risk Factors:  Risk factors include: Advance Directive Discussion  Cardiovascular risk  Osteoporosis Risk  Polypharmacy.    Review of Systems   Constitutional: Negative for chills and fever.   Eyes: Negative for visual disturbance.   Respiratory: Negative for cough and shortness of breath.    Cardiovascular: Negative for chest pain and palpitations.   Gastrointestinal: Negative for abdominal pain, nausea and vomiting.   Musculoskeletal: Positive for gait problem (gets up at night sometimes ).   Skin: Negative for rash.   Neurological: Negative for dizziness, light-headedness and headaches.   Psychiatric/Behavioral: Positive for sleep disturbance. Negative for decreased concentration and dysphoric mood. The patient is nervous/anxious.    All other systems reviewed and are negative.      Compared to one year ago, the patient feels her physical health is better.  Compared to one year ago, the patient feels her mental health is better.    Objective     Physical Exam  Vitals and nursing note reviewed.   Constitutional:       Appearance: Normal appearance. She is well-developed.   HENT:      Head: Normocephalic.      Right Ear: Tympanic membrane and ear canal normal.      Left Ear: Tympanic membrane and ear canal normal.   Eyes:      Extraocular Movements: Extraocular movements intact.      Conjunctiva/sclera: Conjunctivae normal.   Cardiovascular:      Rate and Rhythm: Normal rate and regular rhythm.      Heart sounds: Normal heart sounds.   Pulmonary:      Effort: Pulmonary effort is normal. No respiratory distress.      Breath sounds: Normal breath sounds.   Abdominal:      General: Bowel sounds are normal.      Palpations: Abdomen is soft.      Tenderness: There is no abdominal tenderness.   Skin:     General: Skin is warm and dry.   Neurological:      General: No focal deficit present.      Mental Status: She is alert and oriented to  "person, place, and time.   Psychiatric:         Mood and Affect: Mood normal.         Behavior: Behavior normal.          Procedures     Vitals:    08/22/22 1355   BP: 118/68   Pulse: 84   Temp: 98 °F (36.7 °C)   Weight: 46.7 kg (103 lb)   Height: 157.5 cm (62.01\")   PainSc: 0-No pain       BMI is within normal parameters. No other follow-up for BMI required.      Assessment & Plan   Problem List Items Addressed This Visit        Advance Directives and General Issues    At moderate risk for fall    Current Assessment & Plan     Encourage to get up slowly and get bearings before taking first step. Keep home well lit with clear floors to avoid tripping. Use assist devices for all walking especially from bed to bathroom. Proceed with [facility]         Relevant Orders    Ambulatory Referral to Physical Therapy (Completed)       Cardiac and Vasculature    Hyperlipidemia LDL goal <100    Current Assessment & Plan     Acute issue and elevated and will  improving diet and exercise. Specifically, decrease saturated fats and trans fats and avoid bad carbohydrates (sweet, breads , potatoes, and high caloric drinks).  Also aim for 150 minutes aerobic exercise weekly and resistance exercises 2-3x/week. Add psyliium               Endocrine and Metabolic    Impaired fasting glucose    Current Assessment & Plan     Acute issue and will reduce bad carbs including melons. Discussed decreasing bad carbohydrates, specifically sweets, breads, potatoes, corn and high caloric drinks (juices, sodas, sweet tea).  Also recommend increasing physical activity, ideally 150 minutes aerobic exercise weekly and resistance exercises 2-3x/week. She will add psyllium             Genitourinary and Reproductive     Renal stone    Overview     She had multiple stones Bynd  2019 with multiple surgeries          Current Assessment & Plan     She does follow with Dr Lui yearly             Health Encounters    Medicare annual wellness visit, subsequent " - Primary    Current Assessment & Plan     Her mood is good overall and follows with Dr Doan follows with him for her anxiety. Her recall was good 3 of 3 and good clock. She will see [facility] this Fall. She has mild hearing loss but not bad enough for hearing aides and overall does well with hearing. .Follow with Dr Hoover for ENT.She had a screening for colon cancer per Dr Steel 5/2022 and will need colonoscopy October 2023 Age-appropriate Counseling:  Discussed preventative medicine issues with patient including regular exercise, healthy diet, stress reduction, adequate sleep and recommended age-appropriate screening studies.  Immunizations reviewed.                 Mental Health    Anxiety disorder    Current Assessment & Plan     Psychological condition is improving with treatment.  Continue current treatment regimen.  Regular aerobic exercise.  Psychological condition  will be reassessed at the next regular appointmentFollow with Dr Doan.         Relevant Medications    busPIRone (BUSPAR) 15 MG tablet    PARoxetine (PAXIL) 10 MG tablet    oxazepam (SERAX) 10 MG capsule    zolpidem (AMBIEN) 10 MG tablet       Musculoskeletal and Injuries    Osteoporosis of vertebra    Overview     She took forteo and was intolerant of actonel          Current Assessment & Plan     She is aware she is over due for dexa scan but is afraid to take any of the treatments. She has been intolerant of forteo or actonel.          Relevant Orders    Ambulatory Referral to Physical Therapy (Completed)        Patient Self-Management and Personalized Health Advice  The patient has been provided with information about: diet, exercise, prevention of cardiac or vascular disease, fall prevention, designing advance directives and mental health concerns and preventive services including:   · Annual Wellness Visit (AWV).    Outpatient Encounter Medications as of 8/22/2022   Medication Sig Dispense Refill   • B Complex Vitamins (Vitamin B  Complex) tablet Take 0.5 tablets by mouth 3 (Three) Times a Day.     • busPIRone (BUSPAR) 15 MG tablet Take 15 mg by mouth 2 (Two) Times a Day. Pt states she takes 1/3 of a 15 mg tab bid     • CALCIUM CITRATE PO Take 1 tablet by mouth Daily.     • Cholecalciferol (Vitamin D3) 25 MCG (1000 UT) capsule Take 1,000 Units by mouth Daily.     • estradiol (ESTRACE) 0.1 MG/GM vaginal cream Insert 2 g into the vagina 2 (Two) Times a Week.     • fluticasone (CUTIVATE) 0.05 % cream Apply 1 application topically to the appropriate area as directed Daily As Needed.     • multivitamin (THERAGRAN) tablet tablet Take 1 tablet by mouth Daily. Delphine     • oxazepam (SERAX) 10 MG capsule Take 10 mg by mouth 3 (Three) Times a Day. 1 q am,  1 q afternoon, 2 qhs     • PARoxetine (PAXIL) 10 MG tablet Take 10 mg by mouth Every Morning. Take 1/4 every other day     • psyllium (METAMUCIL) 58.6 % packet Take 1 packet by mouth Daily.     • VITAMIN E PO Take 1 tablet by mouth 2 (Two) Times a Day.     • zolpidem (AMBIEN) 10 MG tablet Take 10 mg by mouth At Night As Needed.       No facility-administered encounter medications on file as of 8/22/2022.       Reviewed use of high risk medication in the elderly: yes  Reviewed for potential of harmful drug interactions in the elderly: yes    Follow Up:  Return in about 1 year (around 8/22/2023) for Medicare Wellness.     There are no Patient Instructions on file for this visit.    An After Visit Summary and PPPS with all of these plans were given to the patient.         Answers for HPI/ROS submitted by the patient on 8/22/2022  What is the primary reason for your visit?: Physical

## 2022-08-23 NOTE — ASSESSMENT & PLAN NOTE
Psychological condition is improving with treatment.  Continue current treatment regimen.  Regular aerobic exercise.  Psychological condition  will be reassessed at the next regular appointmentFollow with Dr Doan.

## 2022-09-19 ENCOUNTER — TELEPHONE (OUTPATIENT)
Dept: INTERNAL MEDICINE | Facility: CLINIC | Age: 72
End: 2022-09-19

## 2022-09-19 NOTE — TELEPHONE ENCOUNTER
Caller: Kelly Waldrop    Relationship: Self    Best call back number: [phone]    What is the best time to reach you: ANY TIME    Who are you requesting to speak with (clinical staff, provider,  specific staff member): CLINICAL    Do you know the name of the person who called:     What was the call regarding: PATIENT HAS AN APPOINTMENT TO GET THE 2 COVID BOOSTER TODAY AT 5 PM. PATIENT NEEDS RECOMMENDATION FROM DR SPANN LETTING HER OK THIS IS OK TO DO BASED ON AGE MEDIAL HISTORY AND SIDE EFFECTS THAT MAY OCCUR. PLEASE ADVISE AND CALL PATIENT OR SEND A [employer] MESSAGE ASAP.     Do you require a callback: YES

## 2023-01-25 ENCOUNTER — TELEPHONE (OUTPATIENT)
Dept: INTERNAL MEDICINE | Facility: CLINIC | Age: 73
End: 2023-01-25

## 2023-01-25 RX ORDER — AMOXICILLIN 500 MG/1
500 CAPSULE ORAL 2 TIMES DAILY
Qty: 14 CAPSULE | Refills: 0 | Status: SHIPPED | OUTPATIENT
Start: 2023-01-25 | End: 2023-08-24

## 2023-01-25 NOTE — TELEPHONE ENCOUNTER
Caller: Kelly Waldrop    Relationship: Self    Best call back number: [phone]  [phone]    What medication are you requesting: MEDICATION FOR TREATMENT OF THE LISTED SYMPTOMS    What are your current symptoms: SINUS PAIN AND PRESSURE ON LEFT SIDE OF FACE-DRAINAGE    How long have you been experiencing symptoms: 1 DAY    Have you had these symptoms before:    [x] Yes  [] No    Have you been treated for these symptoms before:   [x] Yes  [] No    If a prescription is needed, what is your preferred pharmacy and phone number[id number] [address] [phone] [facility] [phone] [facility]     Additional notes:PATIENT CALLED IN EXPRESSING SHE IS EXPERIENCING SINUS ISSUES AND WOULD LIKE A PRESCRIPTION SENT INTO THE PHARMACY FOR TREATMENT

## 2023-05-31 ENCOUNTER — TELEPHONE (OUTPATIENT)
Dept: INTERNAL MEDICINE | Facility: CLINIC | Age: 73
End: 2023-05-31

## 2023-05-31 NOTE — TELEPHONE ENCOUNTER
Caller: Kelly Waldrop    Relationship: Self    Best call back number:[phone]    What orders are you requesting (i.e. lab or imaging): PHYSICAL THERAPY    In what timeframe would the patient need to come in: RESUME  STARTING IN JULY    Where will you receive your lab/imaging services: [facility]    Additional notes:

## 2023-06-01 NOTE — TELEPHONE ENCOUNTER
LVM for pt to return call. HUB can read, We need to tie PT orders to an office visit. If calls in August I could likely tie to her medicare visit if will says what the PT is needed to do and can prechart on that for her August visit. If wants for July should be seen this month sometime.

## 2023-08-03 ENCOUNTER — TELEPHONE (OUTPATIENT)
Dept: INTERNAL MEDICINE | Facility: CLINIC | Age: 73
End: 2023-08-03

## 2023-08-03 DIAGNOSIS — E78.5 HYPERLIPIDEMIA LDL GOAL <100: Primary | ICD-10-CM

## 2023-08-03 DIAGNOSIS — M81.0 OSTEOPOROSIS OF VERTEBRA: ICD-10-CM

## 2023-08-03 DIAGNOSIS — I49.40 PREMATURE HEARTBEATS: ICD-10-CM

## 2023-08-03 DIAGNOSIS — R73.01 IMPAIRED FASTING GLUCOSE: ICD-10-CM

## 2023-08-03 NOTE — TELEPHONE ENCOUNTER
Caller: Kelly Waldrop    Relationship: Self    Best call back number: [phone]     What orders are you requesting (i.e. lab or imaging): LABS FOR UPCOMING APPOINTMENT    In what timeframe would the patient need to come in: PRIOR TO APPOINTMENT    Where will you receive your lab/imaging services: OFFICE LAB

## 2023-08-17 ENCOUNTER — LAB (OUTPATIENT)
Dept: LAB | Facility: HOSPITAL | Age: 73
End: 2023-08-17
Payer: MEDICARE

## 2023-08-17 DIAGNOSIS — R73.01 IMPAIRED FASTING GLUCOSE: ICD-10-CM

## 2023-08-17 DIAGNOSIS — M81.0 OSTEOPOROSIS OF VERTEBRA: ICD-10-CM

## 2023-08-17 DIAGNOSIS — I49.40 PREMATURE HEARTBEATS: ICD-10-CM

## 2023-08-17 DIAGNOSIS — E78.5 HYPERLIPIDEMIA LDL GOAL <100: ICD-10-CM

## 2023-08-17 LAB
25(OH)D3 SERPL-MCNC: 64.1 NG/ML (ref 30–100)
ALBUMIN SERPL-MCNC: 4.5 G/DL (ref 3.5–5.2)
ALBUMIN UR-MCNC: 2.5 MG/DL
ALBUMIN/GLOB SERPL: 1.6 G/DL
ALP SERPL-CCNC: 85 U/L (ref 39–117)
ALT SERPL W P-5'-P-CCNC: 23 U/L (ref 1–33)
ANION GAP SERPL CALCULATED.3IONS-SCNC: 12.1 MMOL/L (ref 5–15)
AST SERPL-CCNC: 31 U/L (ref 1–32)
BASOPHILS # BLD AUTO: 0.07 10*3/MM3 (ref 0–0.2)
BASOPHILS NFR BLD AUTO: 1.3 % (ref 0–1.5)
BILIRUB SERPL-MCNC: 0.6 MG/DL (ref 0–1.2)
BUN SERPL-MCNC: 16 MG/DL (ref 8–23)
BUN/CREAT SERPL: 20.8 (ref 7–25)
CALCIUM SPEC-SCNC: 9.8 MG/DL (ref 8.6–10.5)
CHLORIDE SERPL-SCNC: 100 MMOL/L (ref 98–107)
CHOLEST SERPL-MCNC: 209 MG/DL (ref 0–200)
CO2 SERPL-SCNC: 28.9 MMOL/L (ref 22–29)
CREAT SERPL-MCNC: 0.77 MG/DL (ref 0.57–1)
CREAT UR-MCNC: 13.8 MG/DL
CRP SERPL-MCNC: 0.05 MG/DL (ref 0.01–0.5)
DEPRECATED RDW RBC AUTO: 42.1 FL (ref 37–54)
EGFRCR SERPLBLD CKD-EPI 2021: 82.1 ML/MIN/1.73
EOSINOPHIL # BLD AUTO: 0.27 10*3/MM3 (ref 0–0.4)
EOSINOPHIL NFR BLD AUTO: 5.1 % (ref 0.3–6.2)
ERYTHROCYTE [DISTWIDTH] IN BLOOD BY AUTOMATED COUNT: 12.4 % (ref 12.3–15.4)
GLOBULIN UR ELPH-MCNC: 2.8 GM/DL
GLUCOSE SERPL-MCNC: 102 MG/DL (ref 65–99)
HBA1C MFR BLD: 6.4 % (ref 4.8–5.6)
HCT VFR BLD AUTO: 43.2 % (ref 34–46.6)
HCYS SERPL-MCNC: 6.7 UMOL/L (ref 0–15)
HDLC SERPL-MCNC: 78 MG/DL (ref 40–60)
HGB BLD-MCNC: 14.3 G/DL (ref 12–15.9)
IMM GRANULOCYTES # BLD AUTO: 0.01 10*3/MM3 (ref 0–0.05)
IMM GRANULOCYTES NFR BLD AUTO: 0.2 % (ref 0–0.5)
LDLC SERPL CALC-MCNC: 120 MG/DL (ref 0–100)
LDLC/HDLC SERPL: 1.52 {RATIO}
LYMPHOCYTES # BLD AUTO: 1.77 10*3/MM3 (ref 0.7–3.1)
LYMPHOCYTES NFR BLD AUTO: 33.3 % (ref 19.6–45.3)
MAGNESIUM SERPL-MCNC: 2.2 MG/DL (ref 1.6–2.4)
MCH RBC QN AUTO: 30.8 PG (ref 26.6–33)
MCHC RBC AUTO-ENTMCNC: 33.1 G/DL (ref 31.5–35.7)
MCV RBC AUTO: 92.9 FL (ref 79–97)
MICROALBUMIN/CREAT UR: 181.2 MG/G
MONOCYTES # BLD AUTO: 0.54 10*3/MM3 (ref 0.1–0.9)
MONOCYTES NFR BLD AUTO: 10.2 % (ref 5–12)
NEUTROPHILS NFR BLD AUTO: 2.65 10*3/MM3 (ref 1.7–7)
NEUTROPHILS NFR BLD AUTO: 49.9 % (ref 42.7–76)
NRBC BLD AUTO-RTO: 0 /100 WBC (ref 0–0.2)
PLATELET # BLD AUTO: 242 10*3/MM3 (ref 140–450)
PMV BLD AUTO: 11.1 FL (ref 6–12)
POTASSIUM SERPL-SCNC: 3.8 MMOL/L (ref 3.5–5.2)
PROT SERPL-MCNC: 7.3 G/DL (ref 6–8.5)
RBC # BLD AUTO: 4.65 10*6/MM3 (ref 3.77–5.28)
SODIUM SERPL-SCNC: 141 MMOL/L (ref 136–145)
TRIGL SERPL-MCNC: 64 MG/DL (ref 0–150)
TSH SERPL DL<=0.05 MIU/L-ACNC: 2.16 UIU/ML (ref 0.27–4.2)
VLDLC SERPL-MCNC: 11 MG/DL (ref 5–40)
WBC NRBC COR # BLD: 5.31 10*3/MM3 (ref 3.4–10.8)

## 2023-08-17 PROCEDURE — 84443 ASSAY THYROID STIM HORMONE: CPT

## 2023-08-17 PROCEDURE — 85025 COMPLETE CBC W/AUTO DIFF WBC: CPT

## 2023-08-17 PROCEDURE — 80061 LIPID PANEL: CPT

## 2023-08-17 PROCEDURE — 83036 HEMOGLOBIN GLYCOSYLATED A1C: CPT

## 2023-08-17 PROCEDURE — 86141 C-REACTIVE PROTEIN HS: CPT

## 2023-08-17 PROCEDURE — 83735 ASSAY OF MAGNESIUM: CPT

## 2023-08-17 PROCEDURE — 80053 COMPREHEN METABOLIC PANEL: CPT

## 2023-08-17 PROCEDURE — 82043 UR ALBUMIN QUANTITATIVE: CPT

## 2023-08-17 PROCEDURE — 82306 VITAMIN D 25 HYDROXY: CPT

## 2023-08-17 PROCEDURE — 82570 ASSAY OF URINE CREATININE: CPT

## 2023-08-17 PROCEDURE — 83090 ASSAY OF HOMOCYSTEINE: CPT

## 2023-08-24 ENCOUNTER — OFFICE VISIT (OUTPATIENT)
Dept: INTERNAL MEDICINE | Facility: CLINIC | Age: 73
End: 2023-08-24
Payer: MEDICARE

## 2023-08-24 VITALS
HEART RATE: 72 BPM | DIASTOLIC BLOOD PRESSURE: 62 MMHG | WEIGHT: 95 LBS | HEIGHT: 62 IN | SYSTOLIC BLOOD PRESSURE: 116 MMHG | TEMPERATURE: 98.2 F | BODY MASS INDEX: 17.48 KG/M2

## 2023-08-24 DIAGNOSIS — Z00.00 MEDICARE ANNUAL WELLNESS VISIT, SUBSEQUENT: Primary | ICD-10-CM

## 2023-08-24 DIAGNOSIS — R73.01 IMPAIRED FASTING GLUCOSE: ICD-10-CM

## 2023-08-24 DIAGNOSIS — R21 RASH: ICD-10-CM

## 2023-08-24 DIAGNOSIS — J34.89 NASAL AND SINUS DISCHARGE: ICD-10-CM

## 2023-08-24 DIAGNOSIS — E78.5 HYPERLIPIDEMIA LDL GOAL <100: ICD-10-CM

## 2023-08-24 DIAGNOSIS — E46 PROTEIN-CALORIE MALNUTRITION, UNSPECIFIED SEVERITY: ICD-10-CM

## 2023-08-24 RX ORDER — FLUTICASONE PROPIONATE 0.05 %
1 CREAM (GRAM) TOPICAL 2 TIMES DAILY
Qty: 30 G | Refills: 0 | Status: SHIPPED | OUTPATIENT
Start: 2023-08-24

## 2023-08-24 NOTE — PROGRESS NOTES
QUICK REFERENCE INFORMATION:  The ABCs of the Annual Wellness Visit    Subsequent Medicare Wellness Visit    HEALTH RISK ASSESSMENT    1950    Recent Hospitalizations:  No hospitalization(s) within the last year..        Current Medical Providers:  Patient Care Team:  Hernan Hoover MD as PCP - General (Internal Medicine)        Smoking Status:  Social History     Tobacco Use   Smoking Status Never   Smokeless Tobacco Never       Alcohol Consumption:  Social History     Substance and Sexual Activity   Alcohol Use No       Depression Screen:       2023     1:37 PM   PHQ-2/PHQ-9 Depression Screening   Little Interest or Pleasure in Doing Things 0-->not at all   Feeling Down, Depressed or Hopeless 0-->not at all   PHQ-9: Brief Depression Severity Measure Score 0       Health Habits and Functional and Cognitive Screenin/24/2023     1:37 PM   Functional & Cognitive Status   Do you have difficulty preparing food and eating? No   Do you have difficulty bathing yourself, getting dressed or grooming yourself? No   Do you have difficulty using the toilet? No   Do you have difficulty moving around from place to place? No   Do you have trouble with steps or getting out of a bed or a chair? No   Current Diet Well Balanced Diet   Dental Exam Up to date   Eye Exam Up to date   Exercise (times per week) 6 times per week   Current Exercises Include Treadmill   Do you need help using the phone?  No   Are you deaf or do you have serious difficulty hearing?  No   Do you need help to go to places out of walking distance? No   Do you need help shopping? No   Do you need help preparing meals?  No   Do you need help with housework?  No   Do you need help with laundry? No   Do you need help taking your medications? No   Do you need help managing money? No   Do you ever drive or ride in a car without wearing a seat belt? No   Have you felt unusual stress, anger or loneliness in the last month? No   Who do you live  with? Spouse   If you need help, do you have trouble finding someone available to you? No   Have you been bothered in the last four weeks by sexual problems? No   Do you have difficulty concentrating, remembering or making decisions? No       Fall Risk Screen:  ALLIE Fall Risk Assessment was completed, and patient is at LOW risk for falls.Assessment completed on:8/24/2023    ACE III MINI        Does the patient have evidence of cognitive impairment? No    Aspirin use counseling: Does not need ASA (and currently is not on it)    Recent Lab Results:  CMP:  Lab Results   Component Value Date    BUN 16 08/17/2023    CREATININE 0.77 08/17/2023    EGFRIFNONA 88 10/11/2021    BCR 20.8 08/17/2023     08/17/2023    K 3.8 08/17/2023    CO2 28.9 08/17/2023    CALCIUM 9.8 08/17/2023    ALBUMIN 4.5 08/17/2023    BILITOT 0.6 08/17/2023    ALKPHOS 85 08/17/2023    AST 31 08/17/2023    ALT 23 08/17/2023     HbA1c:  Lab Results   Component Value Date    HGBA1C 6.40 (H) 08/17/2023    HGBA1C 6.10 (H) 08/16/2022     Microalbumin:  Lab Results   Component Value Date    MICROALBUR 2.5 08/17/2023     Lipid Panel  Lab Results   Component Value Date    CHOL 209 (H) 08/17/2023    TRIG 64 08/17/2023    HDL 78 (H) 08/17/2023     (H) 08/17/2023    AST 31 08/17/2023    ALT 23 08/17/2023       Visual Acuity:  No results found.    Age-appropriate Screening Schedule:  Refer to the list below for future screening recommendations based on patient's age, sex and/or medical conditions. Orders for these recommended tests are listed in the plan section. The patient has been provided with a written plan.    Health Maintenance   Topic Date Due    DXA SCAN  Never done    COLORECTAL CANCER SCREENING  Never done    TDAP/TD VACCINES (2 - Td or Tdap) 12/01/2017    COVID-19 Vaccine (7 - Mixed Product series) 01/19/2023    INFLUENZA VACCINE  10/01/2023    LIPID PANEL  08/17/2024    ANNUAL WELLNESS VISIT  08/24/2024    MAMMOGRAM  07/14/2025     HEPATITIS C SCREENING  Completed    Pneumococcal Vaccine 65+  Completed    ZOSTER VACCINE  Completed        Subjective   History of Present Illness    Kelly Waldrop is a 72 y.o. female who presents for a Subsequent Wellness Visit. She has worsening blood sugar and weight loss She has elevated cholesterol. She has some episodes of nasal fullness and some redness of neck     CHRONIC CONDITIONS    The following portions of the patient's history were reviewed and updated as appropriate: allergies, current medications, past family history, past medical history, past social history, past surgical history, and problem list.    Outpatient Medications Prior to Visit   Medication Sig Dispense Refill    B Complex Vitamins (Vitamin B Complex) tablet Take 0.5 tablets by mouth 3 (Three) Times a Day.      busPIRone (BUSPAR) 15 MG tablet Take 1 tablet by mouth 2 (Two) Times a Day. Pt states she takes 1/3 of a 15 mg tab bid      CALCIUM CITRATE PO Take 1 tablet by mouth Daily.      Cholecalciferol (Vitamin D3) 25 MCG (1000 UT) capsule Take 1 capsule by mouth Daily.      estradiol (ESTRACE) 0.1 MG/GM vaginal cream Insert 2 g into the vagina 2 (Two) Times a Week.      fluticasone (CUTIVATE) 0.05 % cream Apply 1 application  topically to the appropriate area as directed Daily As Needed.      multivitamin (THERAGRAN) tablet tablet Take 1 tablet by mouth Daily. Shaklee      oxazepam (SERAX) 10 MG capsule Take 1 capsule by mouth 3 (Three) Times a Day. 1 q am,  1 q afternoon, 2 qhs      PARoxetine (PAXIL) 10 MG tablet Take 1 tablet by mouth Every Morning. Take 1/4 every other day      VITAMIN E PO Take 1 tablet by mouth 2 (Two) Times a Day.      zolpidem (AMBIEN) 10 MG tablet Take 1 tablet by mouth At Night As Needed.      amoxicillin (AMOXIL) 500 MG capsule Take 1 capsule by mouth 2 (Two) Times a Day. 14 capsule 0    psyllium (METAMUCIL) 58.6 % packet Take 1 packet by mouth Daily.       No facility-administered medications prior to  visit.       Patient Active Problem List   Diagnosis    Dermatitis    Anxiety disorder    Urinary tract infectious disease    Irritable bowel syndrome without diarrhea    Renal stone    Impaired fasting glucose    Hyperlipidemia LDL goal <100    Osteoporosis of vertebra    Premature heartbeats    Medicare annual wellness visit, subsequent    At moderate risk for fall       Advance Care Planning:  ACP discussion was held with the patient during this visit. Patient has an advance directive in EMR which is still valid.     Identification of Risk Factors:  Risk factors include: Cardiovascular risk  Diabetic Lab Screening .    Review of Systems   Constitutional:  Negative for chills and fever.   HENT:  Positive for postnasal drip (crusty sinus). Negative for hearing loss.    Respiratory:  Negative for cough and shortness of breath.    Cardiovascular:  Negative for chest pain and palpitations.   Gastrointestinal:  Negative for abdominal pain, nausea and vomiting.   Musculoskeletal:  Negative for back pain and gait problem.   Skin:  Positive for rash.   Neurological:  Negative for dizziness, light-headedness and headaches.   Psychiatric/Behavioral:  Negative for decreased concentration and dysphoric mood. The patient is nervous/anxious.    All other systems reviewed and are negative.    Compared to one year ago, the patient feels her physical health is the same.  Compared to one year ago, the patient feels her mental health is the same.    Objective     Physical Exam  Vitals and nursing note reviewed.   Constitutional:       Appearance: Normal appearance. She is well-developed.   HENT:      Head: Normocephalic.      Right Ear: Tympanic membrane and ear canal normal.      Left Ear: Tympanic membrane and ear canal normal.   Eyes:      Extraocular Movements: Extraocular movements intact.      Conjunctiva/sclera: Conjunctivae normal.   Cardiovascular:      Rate and Rhythm: Normal rate and regular rhythm.      Heart sounds:  "Normal heart sounds.   Pulmonary:      Effort: Pulmonary effort is normal. No respiratory distress.      Breath sounds: Normal breath sounds.   Abdominal:      General: Bowel sounds are normal.      Palpations: Abdomen is soft.      Tenderness: There is no abdominal tenderness.   Skin:     General: Skin is warm and dry.   Neurological:      General: No focal deficit present.      Mental Status: She is alert and oriented to person, place, and time.   Psychiatric:         Mood and Affect: Mood normal.         Behavior: Behavior normal.        Procedures     Vitals:    08/24/23 1335   BP: 116/62   Pulse: 72   Temp: 98.2 øF (36.8 øC)   Weight: 43.1 kg (95 lb)   Height: 157.5 cm (62.01\")   PainSc: 0-No pain       BMI is below normal parameters (malnutrition). Recommendations: eat protein and ntr itrtional status       Assessment & Plan   Problem List Items Addressed This Visit          Cardiac and Vasculature    Hyperlipidemia LDL goal <100    Current Assessment & Plan     Acute issue of elevated cholesterol.          Relevant Orders    Comprehensive Metabolic Panel       Endocrine and Metabolic    Impaired fasting glucose    Current Assessment & Plan     Acute issue of worsening HBA1c to 6.4 and fasting blood sugar > 102  Encouraged to read Dr Hernan Ferreira book to help balance her concern for renal stone and borderline diabetes and trying to keep her weight.          Relevant Orders    Hemoglobin A1c       Health Encounters    Medicare annual wellness visit, subsequent - Primary    Current Assessment & Plan     She has good memory overall 3 of 3 and good clock. Her mood is good overall. She has anxiety about borderline diabetes and low weight. Age-appropriate Counseling:  Discussed preventative medicine issues with patient including regular exercise, healthy diet, stress reduction, adequate sleep and recommended age-appropriate screening studies.  Immunizations reviewed.              Other Visit Diagnoses       " Protein-calorie malnutrition, unspecified severity        Encouraged to try nutrtion per Dr Hernan Ferreira.    Nasal and sinus discharge        Relevant Medications    triamcinolone (KENALOG) 0.1 % ointment    Rash        Relevant Medications    triamcinolone (KENALOG) 0.1 % ointment    fluticasone (CUTIVATE) 0.05 % cream          Patient Self-Management and Personalized Health Advice  The patient has been provided with information about: weight management and preventive services including:   Annual Wellness Visit (AWV).    Outpatient Encounter Medications as of 8/24/2023   Medication Sig Dispense Refill    B Complex Vitamins (Vitamin B Complex) tablet Take 0.5 tablets by mouth 3 (Three) Times a Day.      busPIRone (BUSPAR) 15 MG tablet Take 1 tablet by mouth 2 (Two) Times a Day. Pt states she takes 1/3 of a 15 mg tab bid      CALCIUM CITRATE PO Take 1 tablet by mouth Daily.      Cholecalciferol (Vitamin D3) 25 MCG (1000 UT) capsule Take 1 capsule by mouth Daily.      estradiol (ESTRACE) 0.1 MG/GM vaginal cream Insert 2 g into the vagina 2 (Two) Times a Week.      fluticasone (CUTIVATE) 0.05 % cream Apply 1 application  topically to the appropriate area as directed Daily As Needed.      multivitamin (THERAGRAN) tablet tablet Take 1 tablet by mouth Daily. Shaklee      oxazepam (SERAX) 10 MG capsule Take 1 capsule by mouth 3 (Three) Times a Day. 1 q am,  1 q afternoon, 2 qhs      PARoxetine (PAXIL) 10 MG tablet Take 1 tablet by mouth Every Morning. Take 1/4 every other day      VITAMIN E PO Take 1 tablet by mouth 2 (Two) Times a Day.      zolpidem (AMBIEN) 10 MG tablet Take 1 tablet by mouth At Night As Needed.      fluticasone (CUTIVATE) 0.05 % cream Apply 1 application  topically to the appropriate area as directed 2 (Two) Times a Day. 30 g 0    triamcinolone (KENALOG) 0.1 % ointment Apply 1 application  topically to the appropriate area as directed 2 (Two) Times a Day. 15 g 0    [DISCONTINUED] amoxicillin (AMOXIL)  500 MG capsule Take 1 capsule by mouth 2 (Two) Times a Day. 14 capsule 0    [DISCONTINUED] psyllium (METAMUCIL) 58.6 % packet Take 1 packet by mouth Daily.       No facility-administered encounter medications on file as of 8/24/2023.       Reviewed use of high risk medication in the elderly: no  Reviewed for potential of harmful drug interactions in the elderly: no    Follow Up:  Return in about 5 months (around 1/10/2024) for Recheck.     There are no Patient Instructions on file for this visit.    An After Visit Summary and PPPS with all of these plans were given to the patient.

## 2023-08-24 NOTE — ASSESSMENT & PLAN NOTE
Acute issue of worsening HBA1c to 6.4 and fasting blood sugar > 102  Encouraged to read Dr Hernan Ferreira book to help balance her concern for renal stone and borderline diabetes and trying to keep her weight.

## 2023-08-25 NOTE — ASSESSMENT & PLAN NOTE
She has good memory overall 3 of 3 and good clock. Her mood is good overall. She has anxiety about borderline diabetes and low weight. Age-appropriate Counseling:  Discussed preventative medicine issues with patient including regular exercise, healthy diet, stress reduction, adequate sleep and recommended age-appropriate screening studies.  Immunizations reviewed.

## 2023-09-11 ENCOUNTER — TELEPHONE (OUTPATIENT)
Dept: INTERNAL MEDICINE | Facility: CLINIC | Age: 73
End: 2023-09-11

## 2023-09-11 DIAGNOSIS — R73.01 IMPAIRED FASTING GLUCOSE: Primary | ICD-10-CM

## 2023-09-11 DIAGNOSIS — E44.1 MILD PROTEIN-CALORIE MALNUTRITION: ICD-10-CM

## 2023-09-11 NOTE — TELEPHONE ENCOUNTER
Caller: Kelly Waldrop    Relationship: Self    Best call back number:      What is the medical concern/diagnosis:     What specialty or service is being requested:  NUTRITIONAL CONSULT    What is the provider, practice or medical service name: [facility]      Any additional details: PATIENT IS  TRYING TO DO THIS ON HER OWN, BUT FEELS IT WILL BE EASIER ON HER IF SHE GOES THROUGH A NUTRITIONIST TO GO OVER DIFFERENT OPTIONS FOR HER AS SHE IS HAVING A HARD TIME DOING THIS HERSELF.  SHE DOESN'T WANT TO GET IT WORSE

## 2023-09-13 NOTE — TELEPHONE ENCOUNTER
Caller: Kelly Waldrop    Relationship: Self    Best call back number:     What is the best time to reach you: ANYTIME    Who are you requesting to speak with (clinical staff, provider,  specific staff member): CLINICAL STAFF    Do you know the name of the person who called: KELLY    What was the call regarding: PATIENT WAS CALLING TO CHECK STATUS OF REFERRAL TO NUTRIONIST    Is it okay if the provider responds through MyChart: YES

## 2023-09-20 ENCOUNTER — HOSPITAL ENCOUNTER (OUTPATIENT)
Dept: NUTRITION | Facility: HOSPITAL | Age: 73
Setting detail: RECURRING SERIES
Discharge: HOME | End: 2023-09-20

## 2023-09-20 PROCEDURE — 97802 MEDICAL NUTRITION INDIV IN: CPT

## 2023-10-19 ENCOUNTER — TELEPHONE (OUTPATIENT)
Dept: INTERNAL MEDICINE | Facility: CLINIC | Age: 73
End: 2023-10-19

## 2023-10-19 NOTE — TELEPHONE ENCOUNTER
"Caller: Kelly Waldrop \"Candy\"    Relationship: Self    Best call back number: [phone]     What was the call regarding: PATIENT STATES AT HER LAST APPOINTMENT ON 08/24/23 SHE RECEIVED AN APPOINTMENT SUMMARY AND IT STATED THAT SHE NEEDED TO STOP TAKING PSYLLIUM BUT SHE WAS NEVER ON THAT MEDICATION AND THEN IT STATED SHE WAS DIAGNOSED WITH PROTEIN-CALORIE MALNUTRITION UNSPECIFIED SEVERITY BUT SHE DOES NOT REMEMBER DISCUSSING THAT SO SHE WOULD LIKE A CALL BACK TO DISCUSS THIS.               "

## 2023-10-26 ENCOUNTER — HOSPITAL ENCOUNTER (OUTPATIENT)
Dept: NUTRITION | Facility: HOSPITAL | Age: 73
Setting detail: RECURRING SERIES
Discharge: HOME | End: 2023-10-26
Payer: MEDICARE

## 2023-10-26 NOTE — CONSULTS
"[facility]          Free 30 Minute Nutrition Follow Up     Date: 10/26/2023   Patient Name: Kelly Waldrop  : 1950   MRN: [id number]   Referring Provider: Hernan Hoover MD    Reason for Visit: Impaired fasting glucose, mild protein-calorie malnutrition; also has h/o calcium oxalate kidney stones  Visit Format: In person    Hunger Vital Sign Food Insecurity Assessment:  Within the past 12 months I/we worried whether our food would run out before I/we got money to buy more: No   Within the past 12 months the food I/we bought just didn't last and I/we didn't have money to get more: No   Use of food assistance programs (WIC, food stamps, food [place]) No       Anthropometrics      Height:   Ht Readings from Last 1 Encounters:   23 157.5 cm (62.01\")     Weight:   Wt Readings from Last 3 Encounters:   23 43.1 kg (95 lb)   22 46.7 kg (103 lb)   22 45.8 kg (101 lb)     BMI: There is no height or weight on file to calculate BMI.   Weight Change: Patient had a colonoscopy recently and lost some weight r/t prep (states she went down to 92#)    Discussion / Education      Patient was seen today for follow up related to impaired fasting glucose, mild protein-calorie malnutrition; also has h/o calcium oxalate kidney stones. She has had more energy since her last appointment and has been consuming additional calories and carbohydrates. She reports that she has been incorporated more snacks with a protein during the day and has been feeling more hungry lately. RD encouraged patient to eat anytime she is hungry and to prioritize weight gain over worrying about healthy foods as much. RD discussed the Mediterranean diet with patient and several options at different restaurants that would be balanced for weight gain and glucose control. Patient reports that she had a colonoscopy recently and has had a hard time recovering from the colonoscopy prep. She says she has " lost a bit of weight but is working on recovering. RD encouraged patient to reach out with any additional questions or concerns.    Assessment of patient engagement: Engaged    Measurement of understanding: Patient verbalized understanding, Patient able to demonstrate understanding with teach back     Resources Provided:   Mediterranean Diet Info Sheet  Mediterranean Diet shopping list  Mediterranean Diet packet - [facility]    Follow Up Visit      Follow Up Not scheduled at this time. Patient encouraged to reach out if follow up is desired.    Total of 30 minutes spent with patient on nutrition counseling. Education based on Academy of Nutrition and Dietetics guidelines. Patient was provided with RD's contact information. Thank you for this referral.

## 2024-04-26 ENCOUNTER — OFFICE VISIT (OUTPATIENT)
Dept: OBSTETRICS AND GYNECOLOGY | Facility: CLINIC | Age: 74
End: 2024-04-26
Payer: MEDICARE

## 2024-04-26 VITALS
HEART RATE: 80 BPM | DIASTOLIC BLOOD PRESSURE: 60 MMHG | WEIGHT: 95 LBS | BODY MASS INDEX: 17.37 KG/M2 | SYSTOLIC BLOOD PRESSURE: 112 MMHG

## 2024-04-26 DIAGNOSIS — N64.4 NIPPLE TENDERNESS: Primary | ICD-10-CM

## 2024-04-26 NOTE — PROGRESS NOTES
Subjective   Chief Complaint   Patient presents with    Follow-up     Pt c/o intermittent itching on her left nipple X 6 months.  Pt states that she also notices more redness with that nipple.     Kelly Waldrop is a 73 y.o. year old .  Patient's last menstrual period was 2006 (approximate).  She presents for left breast concerns. She reports left nipple itching that is off and on for 4-5 months at a time. It has been present this time for the last 6 months. Denies nipple discharge or bleeding. There are episodes where the nipple itself will look slightly larger, swollen, and more dark red/brown than the other, and occurs simultaneously with the itching. Denies skin flaking. Her most recent mammogram was in July of last year and was normal. She has a history of a paternal grandmother with breast cancer. Denies changes in bras or laundry detergent. She has not used any creams ot ointment, but uses dove soap in the shower every day.     The following portions of the patient's history were reviewed and updated as appropriate:current medications, allergies, past medical history, and past surgical history    Social History    Tobacco Use      Smoking status: Never      Smokeless tobacco: Never         Objective   /60 (BP Location: Right arm, Patient Position: Sitting, Cuff Size: Adult)   Pulse 80   Wt 43.1 kg (95 lb)   LMP 2006 (Approximate)   BMI 17.37 kg/m²     General:  well developed; well nourished  no acute distress   Lungs:  breathing is unlabored   Heart:  Not performed.   Breasts:  Examined in supine position  Symmetric without masses or skin dimpling  There are no palpable axillary nodes  Left nipple with brown/red discoloration     Lab Review   No data reviewed    Imaging   Mammogram 2023        Assessment   Left nipple discoloration, itching, and swelling      Plan   Discussed nipple darkening and changes on exam. No itching present today. Due to asymmetric itching and color  changes, recommend diagnostic left mammogram for further evaluation. Patient in agreement with plan of care. Order placed today.   The importance of keeping all planned follow-up and taking all medications as prescribed was emphasized.  Follow up for annual exam in 3 months or sooner PRN      No orders of the defined types were placed in this encounter.         This note was electronically signed.    Rama Reed MD   April 26, 2024

## 2024-05-01 ENCOUNTER — TELEPHONE (OUTPATIENT)
Dept: INTERNAL MEDICINE | Facility: CLINIC | Age: 74
End: 2024-05-01

## 2024-05-01 NOTE — TELEPHONE ENCOUNTER
PATIENT HAS CALLED AND STATED SHE HAS RECEIVED A LETTER STATING SHE HAS NOT GOTTEN HER A1C. PATIENT HAS BEEN HAVING A LOT OF THINGS GOING ON AND IS PLANNING TO GET THE A1C DONE IN THE NEAR FUTURE AFTER TAKING CARE OF SOME OTHER MEDICAL ISSUES SHE HAS GOING ON AT THIS TIME.    CALL BACK NUMBER [phone]

## 2024-05-14 ENCOUNTER — HOSPITAL ENCOUNTER (EMERGENCY)
Facility: HOSPITAL | Age: 74
Discharge: HOME | End: 2024-05-14
Attending: EMERGENCY MEDICINE | Admitting: EMERGENCY MEDICINE
Payer: MEDICARE

## 2024-05-14 ENCOUNTER — APPOINTMENT (OUTPATIENT)
Dept: CT IMAGING | Facility: HOSPITAL | Age: 74
End: 2024-05-14
Payer: MEDICARE

## 2024-05-14 VITALS
TEMPERATURE: 98.3 F | HEART RATE: 89 BPM | OXYGEN SATURATION: 97 % | BODY MASS INDEX: 17.48 KG/M2 | DIASTOLIC BLOOD PRESSURE: 60 MMHG | RESPIRATION RATE: 18 BRPM | WEIGHT: 95 LBS | SYSTOLIC BLOOD PRESSURE: 110 MMHG | HEIGHT: 62 IN

## 2024-05-14 DIAGNOSIS — K59.00 CONSTIPATION, UNSPECIFIED CONSTIPATION TYPE: Primary | ICD-10-CM

## 2024-05-14 LAB
ALBUMIN SERPL-MCNC: 3.8 G/DL (ref 3.5–5.2)
ALBUMIN/GLOB SERPL: 1.3 G/DL
ALP SERPL-CCNC: 72 U/L (ref 39–117)
ALT SERPL W P-5'-P-CCNC: 68 U/L (ref 1–33)
ANION GAP SERPL CALCULATED.3IONS-SCNC: 8 MMOL/L (ref 5–15)
AST SERPL-CCNC: 37 U/L (ref 1–32)
BASOPHILS # BLD AUTO: 0.04 10*3/MM3 (ref 0–0.2)
BASOPHILS NFR BLD AUTO: 0.6 % (ref 0–1.5)
BILIRUB SERPL-MCNC: 0.3 MG/DL (ref 0–1.2)
BUN SERPL-MCNC: 13 MG/DL (ref 8–23)
BUN/CREAT SERPL: 24.1 (ref 7–25)
CALCIUM SPEC-SCNC: 9.1 MG/DL (ref 8.6–10.5)
CHLORIDE SERPL-SCNC: 103 MMOL/L (ref 98–107)
CO2 SERPL-SCNC: 30 MMOL/L (ref 22–29)
CREAT SERPL-MCNC: 0.54 MG/DL (ref 0.57–1)
DEPRECATED RDW RBC AUTO: 45.1 FL (ref 37–54)
EGFRCR SERPLBLD CKD-EPI 2021: 97.4 ML/MIN/1.73
EOSINOPHIL # BLD AUTO: 0.04 10*3/MM3 (ref 0–0.4)
EOSINOPHIL NFR BLD AUTO: 0.6 % (ref 0.3–6.2)
ERYTHROCYTE [DISTWIDTH] IN BLOOD BY AUTOMATED COUNT: 13.2 % (ref 12.3–15.4)
GLOBULIN UR ELPH-MCNC: 3 GM/DL
GLUCOSE SERPL-MCNC: 107 MG/DL (ref 65–99)
HCT VFR BLD AUTO: 41.9 % (ref 34–46.6)
HGB BLD-MCNC: 13.7 G/DL (ref 12–15.9)
IMM GRANULOCYTES # BLD AUTO: 0.01 10*3/MM3 (ref 0–0.05)
IMM GRANULOCYTES NFR BLD AUTO: 0.2 % (ref 0–0.5)
LIPASE SERPL-CCNC: 92 U/L (ref 13–60)
LYMPHOCYTES # BLD AUTO: 0.57 10*3/MM3 (ref 0.7–3.1)
LYMPHOCYTES NFR BLD AUTO: 9 % (ref 19.6–45.3)
MAGNESIUM SERPL-MCNC: 2.5 MG/DL (ref 1.6–2.4)
MCH RBC QN AUTO: 30.4 PG (ref 26.6–33)
MCHC RBC AUTO-ENTMCNC: 32.7 G/DL (ref 31.5–35.7)
MCV RBC AUTO: 92.9 FL (ref 79–97)
MONOCYTES # BLD AUTO: 0.51 10*3/MM3 (ref 0.1–0.9)
MONOCYTES NFR BLD AUTO: 8 % (ref 5–12)
NEUTROPHILS NFR BLD AUTO: 5.18 10*3/MM3 (ref 1.7–7)
NEUTROPHILS NFR BLD AUTO: 81.6 % (ref 42.7–76)
NRBC BLD AUTO-RTO: 0 /100 WBC (ref 0–0.2)
PLATELET # BLD AUTO: 217 10*3/MM3 (ref 140–450)
PMV BLD AUTO: 10.2 FL (ref 6–12)
POTASSIUM SERPL-SCNC: 4.1 MMOL/L (ref 3.5–5.2)
PROT SERPL-MCNC: 6.8 G/DL (ref 6–8.5)
RBC # BLD AUTO: 4.51 10*6/MM3 (ref 3.77–5.28)
SODIUM SERPL-SCNC: 141 MMOL/L (ref 136–145)
WBC NRBC COR # BLD AUTO: 6.35 10*3/MM3 (ref 3.4–10.8)

## 2024-05-14 PROCEDURE — 85025 COMPLETE CBC W/AUTO DIFF WBC: CPT | Performed by: EMERGENCY MEDICINE

## 2024-05-14 PROCEDURE — 83690 ASSAY OF LIPASE: CPT | Performed by: EMERGENCY MEDICINE

## 2024-05-14 PROCEDURE — 74176 CT ABD & PELVIS W/O CONTRAST: CPT

## 2024-05-14 PROCEDURE — 83735 ASSAY OF MAGNESIUM: CPT | Performed by: EMERGENCY MEDICINE

## 2024-05-14 PROCEDURE — 99284 EMERGENCY DEPT VISIT MOD MDM: CPT

## 2024-05-14 PROCEDURE — 80053 COMPREHEN METABOLIC PANEL: CPT | Performed by: EMERGENCY MEDICINE

## 2024-05-14 RX ORDER — SODIUM PHOSPHATE,MONO-DIBASIC 19G-7G/118
1 ENEMA (ML) RECTAL ONCE
Status: COMPLETED | OUTPATIENT
Start: 2024-05-14 | End: 2024-05-14

## 2024-05-14 RX ADMIN — SODIUM PHOSPHATE 1 ENEMA: 7; 19 ENEMA RECTAL at 18:01

## 2024-05-14 RX ADMIN — SODIUM PHOSPHATE 1 ENEMA: 7; 19 ENEMA RECTAL at 16:40

## 2024-05-14 NOTE — ED PROVIDER NOTES
Subjective   History of Present Illness  73-year-old female presents for evaluation of constipation.  The patient reports that she has a chronic history of kidney stones.  On Friday of last week, 5 days ago she had a stent placed by the  urologist.  She states that after she left the procedure went home that evening she had 5 hours of nausea vomiting before ultimately resolved.  She has been tolerating oral intake since that given time.  She also reports that she has not had a bowel movement now for the last 5 days.  She has tried to get back to her normal activity but she admits that her overall activity level is decreased relative to baseline.  She does report she received some pain medication with the procedure itself but denies taking continue pain medication since being at home.  No chest pain cough or shortness of breath.  No fever or infectious symptoms.  She does have a previous history of constipation in the past.  She reports that she feels stool in her rectum but is unable to get the past.  No other acute complaints.      Review of Systems   Constitutional:  Negative for chills, fatigue and fever.   HENT:  Negative for congestion, ear pain, postnasal drip, sinus pressure and sore throat.    Eyes:  Negative for pain, redness and visual disturbance.   Respiratory:  Negative for cough, chest tightness and shortness of breath.    Cardiovascular:  Negative for chest pain, palpitations and leg swelling.   Gastrointestinal:  Positive for constipation. Negative for abdominal pain, anal bleeding, blood in stool, diarrhea, nausea and vomiting.   Endocrine: Negative for polydipsia and polyuria.   Genitourinary:  Negative for difficulty urinating, dysuria, frequency and urgency.   Musculoskeletal:  Negative for arthralgias, back pain and neck pain.   Skin:  Negative for pallor and rash.   Allergic/Immunologic: Negative for environmental allergies and immunocompromised state.   Neurological:  Negative for dizziness,  weakness and headaches.   Hematological:  Negative for adenopathy.   Psychiatric/Behavioral:  Negative for confusion, self-injury and suicidal ideas. The patient is not nervous/anxious.    All other systems reviewed and are negative.      Past Medical History:   Diagnosis Date    Acute pharyngitis     Anxiety     Depression     Hyperlipidemia LDL goal <100 2/21/2022    Influenza-like symptoms     Kidney stone     Mitral valve prolapse     Osteoporosis of vertebra 2/21/2022    Pneumonia     2021 with right lung empyema and pleural effsion with thoracentisis for loculated fluid per Dr Calvin and DR Rizo    Premature heartbeats 2/21/2022    Had spell during hospitalization     Renal stone 2/21/2022    She had multiple stones Bynd  2019 with multiple surgeries        Allergies   Allergen Reactions    Benadryl [Diphenhydramine] Other (See Comments)     hyper    Scopolamine Nausea Only and Dizziness       Past Surgical History:   Procedure Laterality Date    LUNG SURGERY Right 09/27/2021    she had infection and pleural scraping    TONSILLECTOMY         Family History   Problem Relation Age of Onset    Stroke Father     Alzheimer's disease Mother     Diabetes Mother     Breast cancer Paternal Grandmother     Ovarian cancer Neg Hx     Uterine cancer Neg Hx     Colon cancer Neg Hx        Social History     Socioeconomic History    Marital status:    Tobacco Use    Smoking status: Never    Smokeless tobacco: Never   Substance and Sexual Activity    Alcohol use: No    Drug use: No    Sexual activity: Yes     Partners: Male           Objective   Physical Exam  Vitals and nursing note reviewed.   Constitutional:       General: She is not in acute distress.     Appearance: Normal appearance. She is well-developed. She is not toxic-appearing or diaphoretic.   HENT:      Head: Normocephalic and atraumatic.      Right Ear: External ear normal.      Left Ear: External ear normal.      Nose: Nose normal.   Eyes:       General: Lids are normal.      Pupils: Pupils are equal, round, and reactive to light.   Neck:      Trachea: No tracheal deviation.   Cardiovascular:      Rate and Rhythm: Normal rate and regular rhythm.      Pulses: No decreased pulses.      Heart sounds: Normal heart sounds. No murmur heard.     No friction rub. No gallop.   Pulmonary:      Effort: Pulmonary effort is normal. No respiratory distress.      Breath sounds: Normal breath sounds. No decreased breath sounds, wheezing, rhonchi or rales.   Abdominal:      General: Bowel sounds are normal.      Palpations: Abdomen is soft.      Tenderness: There is no abdominal tenderness. There is no guarding or rebound.   Musculoskeletal:         General: No deformity. Normal range of motion.      Cervical back: Normal range of motion and neck supple.   Lymphadenopathy:      Cervical: No cervical adenopathy.   Skin:     General: Skin is warm and dry.      Findings: No rash.   Neurological:      Mental Status: She is alert and oriented to person, place, and time.      Cranial Nerves: No cranial nerve deficit.      Sensory: No sensory deficit.   Psychiatric:         Speech: Speech normal.         Behavior: Behavior normal.         Thought Content: Thought content normal.         Judgment: Judgment normal.         Procedures           ED Course                                             Medical Decision Making  Differential diagnosis includes bowel obstruction, dehydration, constipation, postsurgical complication.    No significant lab abnormalities.    Lipase mildly elevated but no significant tenderness in the epigastric region to support pancreatitis no history of alcohol abuse.    CT scan abdomen pelvis without contrast reports constipationBilateral nonobstructing renal calculi.  Moderate right-sided hydronephrosis and right-sided hydroureter.  No obstructing stone at end-fire on the right ureter.  The hydronephrosis may be related to distended urinary bladder.   Left-sided double-J ureteral stent is in place with the upper aspect of the stent in the proximal ureter.  No evidence of left-sided hydronephrosis.  As stated large amount of fecal residue throughout the colon with colonic distention to and including the rectal vault consistent with constipation or impaction.  No evidence of proximal small bowel obstruction.    With the administration of 2 Fleet enemas here the patient did begin to pass stool and reports feeling better.    I will discharge the patient with the GoLytely given the extensive stool throughout the entire colon.    She will be advised to engage in regular activity and follow-up with primary care physician for recheck within the next week.    Problems Addressed:  Constipation, unspecified constipation type: complicated acute illness or injury with systemic symptoms    Amount and/or Complexity of Data Reviewed  External Data Reviewed: labs, radiology and ECG.  Labs: ordered. Decision-making details documented in ED Course.  Radiology: ordered and independent interpretation performed. Decision-making details documented in ED Course.  ECG/medicine tests: ordered and independent interpretation performed. Decision-making details documented in ED Course.    Risk  OTC drugs.  Prescription drug management.        Final diagnoses:   Constipation, unspecified constipation type       ED Disposition  ED Disposition       ED Disposition   Discharge    Condition   Stable    Comment   --               Hernan Hoover MD  [address] [id number] [phone]    In 1 week           Medication List        ASK your doctor about these medications      polyethylene glycol 236 g solution  Commonly known as: GoLYTELY  Take 4,000 mL by mouth 1 (One) Time for 1 dose.  Ask about: Should I take this medication?               Where to Get Your Medications        These medications were sent to [facility] [id number] [place], [place] - [address] [facility] - [phone]  - [phone]  [address] [facility], [facility] [address]      Phone: [phone]   polyethylene glycol 236 g solution            Summer Ferguson MD  05/15/24 4103

## 2024-07-30 ENCOUNTER — TELEPHONE (OUTPATIENT)
Dept: INTERNAL MEDICINE | Facility: CLINIC | Age: 74
End: 2024-07-30

## 2024-07-30 DIAGNOSIS — I49.40 PREMATURE HEARTBEATS: ICD-10-CM

## 2024-07-30 DIAGNOSIS — M81.0 OSTEOPOROSIS OF VERTEBRA: ICD-10-CM

## 2024-07-30 DIAGNOSIS — E78.5 HYPERLIPIDEMIA LDL GOAL <100: Primary | ICD-10-CM

## 2024-07-30 DIAGNOSIS — R74.8 ELEVATED LIPASE: ICD-10-CM

## 2024-07-30 DIAGNOSIS — N20.0 RENAL STONE: ICD-10-CM

## 2024-07-30 DIAGNOSIS — R73.01 IMPAIRED FASTING GLUCOSE: ICD-10-CM

## 2024-07-30 NOTE — TELEPHONE ENCOUNTER
"Caller: Kelly Waldrop \"Samantha\"    Relationship: Self    Best call back number: [phone]     What orders are you requesting (i.e. lab or imaging): BLOOD WORK FOR ANNUAL    In what timeframe would the patient need to come in: THE WEEK OF 09/11/24    Where will you receive your lab/imaging services: NEXT DOOR TO OFFICE      "

## 2024-10-14 ENCOUNTER — TELEPHONE (OUTPATIENT)
Dept: INTERNAL MEDICINE | Facility: CLINIC | Age: 74
End: 2024-10-14

## 2024-10-14 NOTE — TELEPHONE ENCOUNTER
"  Caller: Kelly Waldrop \"Samantha\"    Relationship: Self    Best call back number: [phone]     What was the call regarding: PATIENT WOULD LIKE TO KNOW IF A FLU SHOT WILL AFFECT HER BLOOD WORK.    Is it okay if the provider responds through MyChart: YES  "

## 2024-11-04 ENCOUNTER — TELEPHONE (OUTPATIENT)
Dept: OBSTETRICS AND GYNECOLOGY | Facility: CLINIC | Age: 74
End: 2024-11-04

## 2024-11-04 NOTE — TELEPHONE ENCOUNTER
Ovarian cancer screening, if she is talking about the free yearly US, is done at  through the cancer center.     Rama Reed MD

## 2024-11-04 NOTE — TELEPHONE ENCOUNTER
"Provider: Rama Reed MD     Caller: Kelly Waldrop \"Candy\"     Relationship to Patient: SELF        Phone Number: PT WOULD LIKE RESPONSE THROUGH [facility]    Reason for Call: PT WOULD LIKE TO KNOW IF THE OVARIAN SCREENING THAT THIS PRACTICE DOES WOULD BE AN EXTENSIVE SCREENING, PT WOULD LIKE RESPONSE THROUGH [facility]      "

## 2024-11-05 ENCOUNTER — OFFICE VISIT (OUTPATIENT)
Age: 74
End: 2024-11-05
Payer: MEDICARE

## 2024-11-05 VITALS
HEIGHT: 62 IN | SYSTOLIC BLOOD PRESSURE: 104 MMHG | WEIGHT: 95.3 LBS | DIASTOLIC BLOOD PRESSURE: 58 MMHG | BODY MASS INDEX: 17.54 KG/M2

## 2024-11-05 DIAGNOSIS — Z01.419 WOMEN'S ANNUAL ROUTINE GYNECOLOGICAL EXAMINATION: Primary | ICD-10-CM

## 2024-11-05 DIAGNOSIS — N95.2 VAGINAL ATROPHY: ICD-10-CM

## 2024-11-05 RX ORDER — GUAIFENESIN 600 MG/1
TABLET, EXTENDED RELEASE ORAL
COMMUNITY

## 2024-11-05 RX ORDER — FAMOTIDINE 20 MG/1
20 TABLET, FILM COATED ORAL
COMMUNITY

## 2024-11-05 NOTE — PROGRESS NOTES
"Subjective   Chief Complaint   Patient presents with    Gynecologic Exam     Annual     Kelly Waldrop is a 74 y.o. year old  menopausal female presenting to be seen for her annual exam. She is doing well, and has no complaints.      This past year she has not been on hormone replacement therapy, but stopped using the vaginal cream.  She has not had any vaginal bleeding in the last 12 months.  Menopausal symptoms are not present.    Scheduled for [facility] ovarian cancer screening next month   Up-to-date on mammogram and colonoscopy  DEXA: due for one and would like to discuss with PCP     SEXUAL Hx:  She is currently sexually active.  In the past year there there has been NO new sexual partners.    She would not like to be screened for STD's at today's exam.  [place] is painful: yes - but OTC lubricants ARE effective  HEALTH Hx:  She exercises regularly: yes. She is working on weight training and balance now!   She wears her seat belt: yes.  She has concerns about domestic violence: no.    The following portions of the patient's history were reviewed and updated as appropriate:problem list, current medications, allergies, past family history, past medical history, past social history, and past surgical history.    Social History    Tobacco Use      Smoking status: Never      Smokeless tobacco: Never         Objective   /58 (BP Location: Right arm, Patient Position: Sitting, Cuff Size: Adult)   Ht 157.5 cm (62\")   Wt 43.2 kg (95 lb 4.8 oz)   LMP 2006 (Approximate)   Breastfeeding No   BMI 17.43 kg/m²     General:  well developed; well nourished  no acute distress  mentation appropriate   Skin:  No suspicious lesions seen   Thyroid: not examined   Breasts:  Examined in supine position  Symmetric without masses or skin dimpling  Nipples normal without inversion, lesions or discharge  There are no palpable axillary nodes   Pelvis: Clinical staff was present for exam  External genitalia:  normal " appearance of the external genitalia including Bartholin's and Rembrandt's glands.  :  urethral meatus normal;  Vaginal:  atrophic mucosal changes are present;  Cervix:  normal appearance.  Uterus:  normal size, shape and consistency.  Adnexa:  non palpable bilaterally.  Rectal:  digital rectal exam not performed; anus visually normal appearing.        Assessment   Annual GYN exam   Vaginal atrophy   Known osteoporosis, managed by PCP   Menopausal female currently not on HRT - without significant symptoms affecting activities of daily living  She is up to date on all relevant gynecologic and colorectal screenings except DEXA's for osteoporosis     Plan   Pap was not done today.  I explained to Kelly that the Pap smears are no longer recommended in patient's after 65 years of age.   I stressed to Kelly that she still should be seen to be seen yearly for a full physical including breast and pelvic exam.   She was encouraged to get yearly mammograms.  She should report any palpable breast lump(s) or skin changes regardless of mammographic findings.  I explained to Kelly that notification regarding her mammogram results will come from the center performing the study.  Our office will not be routinely calling with mammogram results.  It is her responsibility to make sure that the results from the mammogram are communicated to her by the breast center.  If she has any questions about the results, she is welcome to call our office anytime.  Bone density testing was recommended.  I reviewed with Kelly that it was always most advisable for all bone density tests for each patient to be done on the same machine over time.  The purpose of this is to improve the accuracy of the interpretation of serial studies.   The importance of keeping all planned follow-up and taking all medications as prescribed was emphasized.  Follow up for annual exam in 1 year or sooner PRN     No orders of the defined types were placed in  this encounter.         This note was electronically signed.    Rama Reed MD  November 5, 2024

## 2025-01-04 ENCOUNTER — HOSPITAL ENCOUNTER (EMERGENCY)
Facility: HOSPITAL | Age: 75
Discharge: HOME | End: 2025-01-04
Attending: EMERGENCY MEDICINE
Payer: MEDICARE

## 2025-01-04 ENCOUNTER — APPOINTMENT (OUTPATIENT)
Dept: GENERAL RADIOLOGY | Facility: HOSPITAL | Age: 75
End: 2025-01-04
Payer: MEDICARE

## 2025-01-04 VITALS
WEIGHT: 95 LBS | BODY MASS INDEX: 17.48 KG/M2 | TEMPERATURE: 98.2 F | SYSTOLIC BLOOD PRESSURE: 109 MMHG | HEART RATE: 89 BPM | DIASTOLIC BLOOD PRESSURE: 50 MMHG | HEIGHT: 62 IN | OXYGEN SATURATION: 97 % | RESPIRATION RATE: 22 BRPM

## 2025-01-04 DIAGNOSIS — R05.1 ACUTE COUGH: ICD-10-CM

## 2025-01-04 DIAGNOSIS — R11.2 NAUSEA AND VOMITING, UNSPECIFIED VOMITING TYPE: Primary | ICD-10-CM

## 2025-01-04 DIAGNOSIS — B34.9 VIRAL SYNDROME: ICD-10-CM

## 2025-01-04 LAB
ALBUMIN SERPL-MCNC: 3.9 G/DL (ref 3.5–5.2)
ALBUMIN/GLOB SERPL: 1.3 G/DL
ALP SERPL-CCNC: 87 U/L (ref 39–117)
ALT SERPL W P-5'-P-CCNC: 25 U/L (ref 1–33)
ANION GAP SERPL CALCULATED.3IONS-SCNC: 13 MMOL/L (ref 5–15)
AST SERPL-CCNC: 31 U/L (ref 1–32)
B PARAPERT DNA SPEC QL NAA+PROBE: NOT DETECTED
B PERT DNA SPEC QL NAA+PROBE: NOT DETECTED
BACTERIA UR QL AUTO: ABNORMAL /HPF
BASOPHILS # BLD AUTO: 0.02 10*3/MM3 (ref 0–0.2)
BASOPHILS NFR BLD AUTO: 0.2 % (ref 0–1.5)
BILIRUB SERPL-MCNC: 0.7 MG/DL (ref 0–1.2)
BILIRUB UR QL STRIP: NEGATIVE
BUN SERPL-MCNC: 20 MG/DL (ref 8–23)
BUN/CREAT SERPL: 36.4 (ref 7–25)
C PNEUM DNA NPH QL NAA+NON-PROBE: NOT DETECTED
CALCIUM SPEC-SCNC: 9.1 MG/DL (ref 8.6–10.5)
CHLORIDE SERPL-SCNC: 97 MMOL/L (ref 98–107)
CLARITY UR: CLEAR
CO2 SERPL-SCNC: 28 MMOL/L (ref 22–29)
COLOR UR: YELLOW
CREAT SERPL-MCNC: 0.55 MG/DL (ref 0.57–1)
DEPRECATED RDW RBC AUTO: 46.8 FL (ref 37–54)
EGFRCR SERPLBLD CKD-EPI 2021: 96.3 ML/MIN/1.73
EOSINOPHIL # BLD AUTO: 0 10*3/MM3 (ref 0–0.4)
EOSINOPHIL NFR BLD AUTO: 0 % (ref 0.3–6.2)
ERYTHROCYTE [DISTWIDTH] IN BLOOD BY AUTOMATED COUNT: 13.6 % (ref 12.3–15.4)
FLUAV SUBTYP SPEC NAA+PROBE: NOT DETECTED
FLUBV RNA ISLT QL NAA+PROBE: NOT DETECTED
GLOBULIN UR ELPH-MCNC: 2.9 GM/DL
GLUCOSE SERPL-MCNC: 157 MG/DL (ref 65–99)
GLUCOSE UR STRIP-MCNC: NEGATIVE MG/DL
HADV DNA SPEC NAA+PROBE: NOT DETECTED
HCOV 229E RNA SPEC QL NAA+PROBE: NOT DETECTED
HCOV HKU1 RNA SPEC QL NAA+PROBE: NOT DETECTED
HCOV NL63 RNA SPEC QL NAA+PROBE: NOT DETECTED
HCOV OC43 RNA SPEC QL NAA+PROBE: NOT DETECTED
HCT VFR BLD AUTO: 44 % (ref 34–46.6)
HGB BLD-MCNC: 14.4 G/DL (ref 12–15.9)
HGB UR QL STRIP.AUTO: ABNORMAL
HMPV RNA NPH QL NAA+NON-PROBE: NOT DETECTED
HPIV1 RNA ISLT QL NAA+PROBE: NOT DETECTED
HPIV2 RNA SPEC QL NAA+PROBE: NOT DETECTED
HPIV3 RNA NPH QL NAA+PROBE: NOT DETECTED
HPIV4 P GENE NPH QL NAA+PROBE: NOT DETECTED
HYALINE CASTS UR QL AUTO: ABNORMAL /LPF
IMM GRANULOCYTES # BLD AUTO: 0.02 10*3/MM3 (ref 0–0.05)
IMM GRANULOCYTES NFR BLD AUTO: 0.2 % (ref 0–0.5)
KETONES UR QL STRIP: NEGATIVE
LEUKOCYTE ESTERASE UR QL STRIP.AUTO: ABNORMAL
LIPASE SERPL-CCNC: 37 U/L (ref 13–60)
LYMPHOCYTES # BLD AUTO: 0.16 10*3/MM3 (ref 0.7–3.1)
LYMPHOCYTES NFR BLD AUTO: 1.9 % (ref 19.6–45.3)
M PNEUMO IGG SER IA-ACNC: NOT DETECTED
MCH RBC QN AUTO: 30.6 PG (ref 26.6–33)
MCHC RBC AUTO-ENTMCNC: 32.7 G/DL (ref 31.5–35.7)
MCV RBC AUTO: 93.4 FL (ref 79–97)
MONOCYTES # BLD AUTO: 0.35 10*3/MM3 (ref 0.1–0.9)
MONOCYTES NFR BLD AUTO: 4.1 % (ref 5–12)
NEUTROPHILS NFR BLD AUTO: 8.08 10*3/MM3 (ref 1.7–7)
NEUTROPHILS NFR BLD AUTO: 93.6 % (ref 42.7–76)
NITRITE UR QL STRIP: NEGATIVE
NRBC BLD AUTO-RTO: 0 /100 WBC (ref 0–0.2)
PH UR STRIP.AUTO: 6 [PH] (ref 5–8)
PLATELET # BLD AUTO: 191 10*3/MM3 (ref 140–450)
PMV BLD AUTO: 10.4 FL (ref 6–12)
POTASSIUM SERPL-SCNC: 3.5 MMOL/L (ref 3.5–5.2)
PROT SERPL-MCNC: 6.8 G/DL (ref 6–8.5)
PROT UR QL STRIP: ABNORMAL
RBC # BLD AUTO: 4.71 10*6/MM3 (ref 3.77–5.28)
RBC # UR STRIP: ABNORMAL /HPF
REF LAB TEST METHOD: ABNORMAL
RHINOVIRUS RNA SPEC NAA+PROBE: NOT DETECTED
RSV RNA NPH QL NAA+NON-PROBE: NOT DETECTED
SARS-COV-2 RNA NPH QL NAA+NON-PROBE: NOT DETECTED
SODIUM SERPL-SCNC: 138 MMOL/L (ref 136–145)
SP GR UR STRIP: 1.02 (ref 1–1.03)
SQUAMOUS #/AREA URNS HPF: ABNORMAL /HPF
UROBILINOGEN UR QL STRIP: ABNORMAL
WBC # UR STRIP: ABNORMAL /HPF
WBC NRBC COR # BLD AUTO: 8.63 10*3/MM3 (ref 3.4–10.8)

## 2025-01-04 PROCEDURE — 93005 ELECTROCARDIOGRAM TRACING: CPT

## 2025-01-04 PROCEDURE — 96374 THER/PROPH/DIAG INJ IV PUSH: CPT

## 2025-01-04 PROCEDURE — 83690 ASSAY OF LIPASE: CPT

## 2025-01-04 PROCEDURE — 81001 URINALYSIS AUTO W/SCOPE: CPT

## 2025-01-04 PROCEDURE — 71045 X-RAY EXAM CHEST 1 VIEW: CPT

## 2025-01-04 PROCEDURE — 80053 COMPREHEN METABOLIC PANEL: CPT

## 2025-01-04 PROCEDURE — 99284 EMERGENCY DEPT VISIT MOD MDM: CPT

## 2025-01-04 PROCEDURE — 25810000003 SODIUM CHLORIDE 0.9 % SOLUTION

## 2025-01-04 PROCEDURE — 25010000002 PROCHLORPERAZINE 10 MG/2ML SOLUTION

## 2025-01-04 PROCEDURE — 0202U NFCT DS 22 TRGT SARS-COV-2: CPT

## 2025-01-04 PROCEDURE — 85025 COMPLETE CBC W/AUTO DIFF WBC: CPT

## 2025-01-04 RX ORDER — BROMPHENIRAMINE MALEATE, PSEUDOEPHEDRINE HYDROCHLORIDE, AND DEXTROMETHORPHAN HYDROBROMIDE 2; 30; 10 MG/5ML; MG/5ML; MG/5ML
2.5 SYRUP ORAL 4 TIMES DAILY PRN
Qty: 118 ML | Refills: 0 | Status: SHIPPED | OUTPATIENT
Start: 2025-01-04 | End: 2025-01-04

## 2025-01-04 RX ORDER — BROMPHENIRAMINE MALEATE, PSEUDOEPHEDRINE HYDROCHLORIDE, AND DEXTROMETHORPHAN HYDROBROMIDE 2; 30; 10 MG/5ML; MG/5ML; MG/5ML
2.5 SYRUP ORAL 4 TIMES DAILY PRN
Qty: 118 ML | Refills: 0 | Status: SHIPPED | OUTPATIENT
Start: 2025-01-04

## 2025-01-04 RX ORDER — PROMETHAZINE HYDROCHLORIDE 25 MG/1
25 SUPPOSITORY RECTAL EVERY 6 HOURS PRN
Qty: 12 SUPPOSITORY | Refills: 0 | Status: SHIPPED | OUTPATIENT
Start: 2025-01-04 | End: 2025-01-04

## 2025-01-04 RX ORDER — PROCHLORPERAZINE MALEATE 10 MG
10 TABLET ORAL EVERY 6 HOURS PRN
Qty: 8 TABLET | Refills: 0 | Status: SHIPPED | OUTPATIENT
Start: 2025-01-04

## 2025-01-04 RX ORDER — PROMETHAZINE HYDROCHLORIDE 25 MG/1
25 SUPPOSITORY RECTAL EVERY 6 HOURS PRN
Qty: 12 SUPPOSITORY | Refills: 0 | Status: SHIPPED | OUTPATIENT
Start: 2025-01-04

## 2025-01-04 RX ORDER — PROCHLORPERAZINE EDISYLATE 5 MG/ML
10 INJECTION INTRAMUSCULAR; INTRAVENOUS ONCE
Status: COMPLETED | OUTPATIENT
Start: 2025-01-04 | End: 2025-01-04

## 2025-01-04 RX ORDER — SODIUM CHLORIDE 0.9 % (FLUSH) 0.9 %
10 SYRINGE (ML) INJECTION AS NEEDED
Status: DISCONTINUED | OUTPATIENT
Start: 2025-01-04 | End: 2025-01-04 | Stop reason: HOSPADM

## 2025-01-04 RX ADMIN — SODIUM CHLORIDE 1000 ML: 9 INJECTION, SOLUTION INTRAVENOUS at 16:13

## 2025-01-04 RX ADMIN — PROCHLORPERAZINE EDISYLATE 10 MG: 5 INJECTION INTRAMUSCULAR; INTRAVENOUS at 16:14

## 2025-01-04 NOTE — ED PROVIDER NOTES
Subjective   History of Present Illness  Patient is a 74-year-old female accompanied by her , she is ill-appearing reported copious vomiting since about 1 AM this morning with ongoing nausea.  She also complains of dry mouth and is concerned over not adhering to her medication administration regimen as she vomits with any oral intake including food or fluids.  Patient also endorses chills, body aches, patient denies known sick contact    Review of Systems   Constitutional:  Positive for chills and fatigue.   HENT:  Positive for congestion.    Respiratory:  Positive for chest tightness.    Gastrointestinal:  Positive for diarrhea, nausea and vomiting. Negative for abdominal distention and abdominal pain.   Genitourinary: Negative.    Musculoskeletal: Negative.    Neurological: Negative.        Past Medical History:   Diagnosis Date    Acute pharyngitis     Anxiety     Depression     Hyperlipidemia LDL goal <100 2/21/2022    Influenza-like symptoms     Kidney stone     Mitral valve prolapse     Osteoporosis of vertebra 2/21/2022    Pneumonia     2021 with right lung empyema and pleural effsion with thoracentisis for loculated fluid per Dr Calvin and DR Rizo    Premature heartbeats 2/21/2022    Had spell during hospitalization     Renal stone 2/21/2022    She had multiple stones Bynd  2019 with multiple surgeries        Allergies   Allergen Reactions    Benadryl [Diphenhydramine] Other (See Comments)     hyper    Scopolamine Nausea Only and Dizziness    Grass Pollen(K-O-R-T-Swt Vic) Other (See Comments)     congestion    Ondansetron Other (See Comments)     Causes Constipation       Past Surgical History:   Procedure Laterality Date    KIDNEY STONE SURGERY      LUNG SURGERY Right 09/27/2021    she had infection and pleural scraping    TONSILLECTOMY         Family History   Problem Relation Age of Onset    Stroke Father     Alzheimer's disease Mother     Diabetes Mother     Breast cancer Paternal Grandmother      Ovarian cancer Neg Hx     Uterine cancer Neg Hx     Colon cancer Neg Hx        Social History     Socioeconomic History    Marital status:    Tobacco Use    Smoking status: Never    Smokeless tobacco: Never   Vaping Use    Vaping status: Never Used   Substance and Sexual Activity    Alcohol use: No    Drug use: No    Sexual activity: Yes     Partners: Male           Objective   Physical Exam  Constitutional:       Appearance: Normal appearance. She is ill-appearing.   HENT:      Head: Normocephalic and atraumatic.      Right Ear: External ear normal.      Left Ear: External ear normal.      Nose: Nose normal.   Eyes:      Extraocular Movements: Extraocular movements intact.      Pupils: Pupils are equal, round, and reactive to light.   Cardiovascular:      Rate and Rhythm: Tachycardia present.      Pulses: Normal pulses.   Pulmonary:      Effort: Pulmonary effort is normal.      Breath sounds: Normal breath sounds.   Abdominal:      General: Abdomen is flat. Bowel sounds are normal.      Palpations: Abdomen is soft.      Tenderness: There is no abdominal tenderness. There is no right CVA tenderness or left CVA tenderness.   Musculoskeletal:         General: Normal range of motion.      Cervical back: Normal range of motion.   Skin:     General: Skin is warm and dry.      Capillary Refill: Capillary refill takes less than 2 seconds.   Neurological:      General: No focal deficit present.      Mental Status: She is alert and oriented to person, place, and time.         Procedures           ED Course  ED Course as of 01/04/25 1750   Sat Jan 04, 2025   1444 Patient initially evaluated in the ED pit area, accompanied by her  [JH]   1453 Patient escorted to the ED registration area []   1554 CBC within normal limits with exception of a left shift of neutrophils.  Serum chemistry nonactionable.  Urinalysis with notable pyuria and small hematuria. []   1554 Plain film of the patient's chest independent  interpreted by myself without acute abnormality, consolidation, opacity or effusion []   1555 12 Lead ECG interpreted by myself without concerning elevation or depression in anterior leads []   1611 Respiratory panel is negative.  Awaiting medication administration reassessment for this patient.  She has now been placed in ED room 19. []   1748 Reevaluated the patient, she has had no further vomiting episodes, she is almost finished her 1 L fluid bolus we discussed antiemetic prescription options and will send several as well as an antitussive, she is agreeable disposition home. []      ED Course User Index  [] Arnaldo Au APRN                                                       Medical Decision Making  Given the patient's complaints, their acute onset today, her previous medical history and my exam, differential includes upper respiratory or other gastrointestinal viral syndrome, cannot exclude dehydration, acute coronary syndrome is less likely will have twelve-lead ECG, plain film imaging of chest, serum screening labs, urinalysis and respiratory viral panel performed.  We will communicate all results and plan her disposition.  We will administer IV crystalloid and antiemetic medications and reevaluate for improvement.  Patient is agreeable with this plan.    Amount and/or Complexity of Data Reviewed  Labs: ordered.  Radiology: ordered.  ECG/medicine tests: ordered.    Risk  Prescription drug management.        Final diagnoses:   Nausea and vomiting, unspecified vomiting type   Viral syndrome   Acute cough       ED Disposition  ED Disposition       ED Disposition   Discharge    Condition   Stable    Comment   --               Hernan Hoover MD  [address] [place], [place] [id number] [phone]      As needed         Medication List        New Prescriptions      brompheniramine-pseudoephedrine-DM 30-2-10 MG/5ML syrup  Take 2.5 mL by mouth 4 (Four) Times a Day As Needed for  Allergies.     prochlorperazine 10 MG tablet  Commonly known as: COMPAZINE  Take 1 tablet by mouth Every 6 (Six) Hours As Needed for Nausea or Vomiting.     promethazine 25 MG suppository  Commonly known as: PHENERGAN  Insert 1 suppository into the rectum Every 6 (Six) Hours As Needed for Nausea or Vomiting.            Changed      zolpidem 10 MG tablet  Commonly known as: AMBIEN  What changed: additional instructions               Where to Get Your Medications        These medications were sent to [facility] [id number] - [address] [address] [facility] - [phone]  - [phone]   [address] [facility] [address]      Phone: [phone]   brompheniramine-pseudoephedrine-DM 30-2-10 MG/5ML syrup  prochlorperazine 10 MG tablet  promethazine 25 MG suppository            Arnaldo Au, APRN  01/04/25 5585

## 2025-01-04 NOTE — DISCHARGE INSTRUCTIONS
Prescribed medicines with caution and only according to the instructions.  Use over-the-counter symptomatic relieving agents such as lozenges, sprays, and attempt to maintain oral hydration as your viral symptoms should pass with time, many people are experiencing symptoms for 5 to 7 days.  Follow-up with primary care return to ED for new or concerning symptoms as needed.

## 2025-01-05 LAB
QT INTERVAL: 362 MS
QTC INTERVAL: 454 MS

## 2025-01-13 ENCOUNTER — TELEPHONE (OUTPATIENT)
Dept: CASE MANAGEMENT | Facility: OTHER | Age: 75
End: 2025-01-13
Payer: MEDICARE

## 2025-01-13 NOTE — TELEPHONE ENCOUNTER
RN-ACM made outreach to patient regarding recent ED visit. No answer, left message.    F/u outreach scheduled.

## 2025-01-16 ENCOUNTER — TELEPHONE (OUTPATIENT)
Dept: CASE MANAGEMENT | Facility: OTHER | Age: 75
End: 2025-01-16
Payer: MEDICARE

## 2025-01-20 ENCOUNTER — TELEPHONE (OUTPATIENT)
Dept: CASE MANAGEMENT | Facility: OTHER | Age: 75
End: 2025-01-20
Payer: MEDICARE

## 2025-01-20 NOTE — TELEPHONE ENCOUNTER
RN-MILE sent patient a message through [employer] regarding ED visit. See In Basket for details.

## 2025-01-21 ENCOUNTER — TELEPHONE (OUTPATIENT)
Dept: CASE MANAGEMENT | Facility: OTHER | Age: 75
End: 2025-01-21
Payer: MEDICARE

## 2025-01-21 NOTE — TELEPHONE ENCOUNTER
RN-ACM made three unsuccessful attempts to reach patient regarding recent ED visit. ACM will close program at this time.

## 2025-01-28 ENCOUNTER — LAB (OUTPATIENT)
Dept: LAB | Facility: HOSPITAL | Age: 75
End: 2025-01-28
Payer: MEDICARE

## 2025-01-28 DIAGNOSIS — E78.5 HYPERLIPIDEMIA LDL GOAL <100: ICD-10-CM

## 2025-01-28 DIAGNOSIS — M81.0 OSTEOPOROSIS OF VERTEBRA: ICD-10-CM

## 2025-01-28 DIAGNOSIS — R73.01 IMPAIRED FASTING GLUCOSE: ICD-10-CM

## 2025-01-28 DIAGNOSIS — I49.40 PREMATURE HEARTBEATS: ICD-10-CM

## 2025-01-28 DIAGNOSIS — R74.8 ELEVATED LIPASE: ICD-10-CM

## 2025-01-28 LAB
25(OH)D3 SERPL-MCNC: 75.5 NG/ML (ref 30–100)
BASOPHILS # BLD AUTO: 0.03 10*3/MM3 (ref 0–0.2)
BASOPHILS NFR BLD AUTO: 0.4 % (ref 0–1.5)
DEPRECATED RDW RBC AUTO: 40.5 FL (ref 37–54)
EOSINOPHIL # BLD AUTO: 0.23 10*3/MM3 (ref 0–0.4)
EOSINOPHIL NFR BLD AUTO: 2.9 % (ref 0.3–6.2)
ERYTHROCYTE [DISTWIDTH] IN BLOOD BY AUTOMATED COUNT: 11.6 % (ref 12.3–15.4)
HBA1C MFR BLD: 5.7 % (ref 4.8–5.6)
HCT VFR BLD AUTO: 43 % (ref 34–46.6)
HGB BLD-MCNC: 14 G/DL (ref 12–15.9)
IMM GRANULOCYTES # BLD AUTO: 0.02 10*3/MM3 (ref 0–0.05)
IMM GRANULOCYTES NFR BLD AUTO: 0.3 % (ref 0–0.5)
LYMPHOCYTES # BLD AUTO: 1.61 10*3/MM3 (ref 0.7–3.1)
LYMPHOCYTES NFR BLD AUTO: 20.3 % (ref 19.6–45.3)
MCH RBC QN AUTO: 30.8 PG (ref 26.6–33)
MCHC RBC AUTO-ENTMCNC: 32.6 G/DL (ref 31.5–35.7)
MCV RBC AUTO: 94.5 FL (ref 79–97)
MONOCYTES # BLD AUTO: 0.85 10*3/MM3 (ref 0.1–0.9)
MONOCYTES NFR BLD AUTO: 10.7 % (ref 5–12)
NEUTROPHILS NFR BLD AUTO: 5.18 10*3/MM3 (ref 1.7–7)
NEUTROPHILS NFR BLD AUTO: 65.4 % (ref 42.7–76)
NRBC BLD AUTO-RTO: 0 /100 WBC (ref 0–0.2)
PLATELET # BLD AUTO: 361 10*3/MM3 (ref 140–450)
PMV BLD AUTO: 9.9 FL (ref 6–12)
RBC # BLD AUTO: 4.55 10*6/MM3 (ref 3.77–5.28)
WBC NRBC COR # BLD AUTO: 7.92 10*3/MM3 (ref 3.4–10.8)

## 2025-01-28 PROCEDURE — 80053 COMPREHEN METABOLIC PANEL: CPT

## 2025-01-28 PROCEDURE — 83036 HEMOGLOBIN GLYCOSYLATED A1C: CPT

## 2025-01-28 PROCEDURE — 83090 ASSAY OF HOMOCYSTEINE: CPT

## 2025-01-28 PROCEDURE — 82570 ASSAY OF URINE CREATININE: CPT

## 2025-01-28 PROCEDURE — 80061 LIPID PANEL: CPT

## 2025-01-28 PROCEDURE — 83690 ASSAY OF LIPASE: CPT

## 2025-01-28 PROCEDURE — 82306 VITAMIN D 25 HYDROXY: CPT

## 2025-01-28 PROCEDURE — 82043 UR ALBUMIN QUANTITATIVE: CPT

## 2025-01-28 PROCEDURE — 85025 COMPLETE CBC W/AUTO DIFF WBC: CPT

## 2025-01-28 PROCEDURE — 84443 ASSAY THYROID STIM HORMONE: CPT

## 2025-01-28 PROCEDURE — 83735 ASSAY OF MAGNESIUM: CPT

## 2025-01-29 LAB
ALBUMIN SERPL-MCNC: 3.5 G/DL (ref 3.5–5.2)
ALBUMIN UR-MCNC: 2.2 MG/DL
ALBUMIN/GLOB SERPL: 0.9 G/DL
ALP SERPL-CCNC: 99 U/L (ref 39–117)
ALT SERPL W P-5'-P-CCNC: 19 U/L (ref 1–33)
ANION GAP SERPL CALCULATED.3IONS-SCNC: 11.8 MMOL/L (ref 5–15)
AST SERPL-CCNC: 25 U/L (ref 1–32)
BILIRUB SERPL-MCNC: 0.4 MG/DL (ref 0–1.2)
BUN SERPL-MCNC: 15 MG/DL (ref 8–23)
BUN/CREAT SERPL: 23.8 (ref 7–25)
CALCIUM SPEC-SCNC: 9.3 MG/DL (ref 8.6–10.5)
CHLORIDE SERPL-SCNC: 98 MMOL/L (ref 98–107)
CHOLEST SERPL-MCNC: 176 MG/DL (ref 0–200)
CO2 SERPL-SCNC: 27.2 MMOL/L (ref 22–29)
CREAT SERPL-MCNC: 0.63 MG/DL (ref 0.57–1)
CREAT UR-MCNC: 31.1 MG/DL
EGFRCR SERPLBLD CKD-EPI 2021: 93.2 ML/MIN/1.73
GLOBULIN UR ELPH-MCNC: 3.8 GM/DL
GLUCOSE SERPL-MCNC: 85 MG/DL (ref 65–99)
HCYS SERPL-MCNC: 5.4 UMOL/L (ref 0–15)
HDLC SERPL-MCNC: 52 MG/DL (ref 40–60)
LDLC SERPL CALC-MCNC: 114 MG/DL (ref 0–100)
LDLC/HDLC SERPL: 2.18 {RATIO}
LIPASE SERPL-CCNC: 87 U/L (ref 13–60)
MAGNESIUM SERPL-MCNC: 2.2 MG/DL (ref 1.6–2.4)
MICROALBUMIN/CREAT UR: 70.7 MG/G (ref 0–29)
POTASSIUM SERPL-SCNC: 4.1 MMOL/L (ref 3.5–5.2)
PROT SERPL-MCNC: 7.3 G/DL (ref 6–8.5)
SODIUM SERPL-SCNC: 137 MMOL/L (ref 136–145)
TRIGL SERPL-MCNC: 52 MG/DL (ref 0–150)
TSH SERPL DL<=0.05 MIU/L-ACNC: 3.06 UIU/ML (ref 0.27–4.2)
VLDLC SERPL-MCNC: 10 MG/DL (ref 5–40)

## 2025-01-31 ENCOUNTER — OFFICE VISIT (OUTPATIENT)
Dept: INTERNAL MEDICINE | Facility: CLINIC | Age: 75
End: 2025-01-31
Payer: MEDICARE

## 2025-01-31 VITALS
HEIGHT: 62 IN | HEART RATE: 70 BPM | BODY MASS INDEX: 17.11 KG/M2 | TEMPERATURE: 98 F | DIASTOLIC BLOOD PRESSURE: 64 MMHG | SYSTOLIC BLOOD PRESSURE: 114 MMHG | WEIGHT: 93 LBS

## 2025-01-31 DIAGNOSIS — J01.00 ACUTE NON-RECURRENT MAXILLARY SINUSITIS: ICD-10-CM

## 2025-01-31 DIAGNOSIS — R11.12 PROJECTILE VOMITING WITH NAUSEA: ICD-10-CM

## 2025-01-31 DIAGNOSIS — I47.10 SVT (SUPRAVENTRICULAR TACHYCARDIA): ICD-10-CM

## 2025-01-31 DIAGNOSIS — I49.40 PREMATURE HEARTBEATS: ICD-10-CM

## 2025-01-31 DIAGNOSIS — R73.01 IMPAIRED FASTING GLUCOSE: ICD-10-CM

## 2025-01-31 DIAGNOSIS — Z00.00 MEDICARE ANNUAL WELLNESS VISIT, SUBSEQUENT: Primary | ICD-10-CM

## 2025-01-31 DIAGNOSIS — I34.1 MITRAL VALVE PROLAPSE: ICD-10-CM

## 2025-01-31 DIAGNOSIS — E46 PROTEIN-CALORIE MALNUTRITION, UNSPECIFIED SEVERITY: ICD-10-CM

## 2025-01-31 DIAGNOSIS — J34.89 SINUS PRESSURE: ICD-10-CM

## 2025-01-31 DIAGNOSIS — N20.0 RENAL STONE: ICD-10-CM

## 2025-01-31 DIAGNOSIS — E78.5 HYPERLIPIDEMIA LDL GOAL <100: ICD-10-CM

## 2025-01-31 DIAGNOSIS — R00.0 TACHYCARDIA: ICD-10-CM

## 2025-01-31 RX ORDER — ONDANSETRON 8 MG/1
8 TABLET, ORALLY DISINTEGRATING ORAL EVERY 8 HOURS PRN
Qty: 20 TABLET | Refills: 1 | Status: SHIPPED | OUTPATIENT
Start: 2025-01-31 | End: 2025-01-31 | Stop reason: SDUPTHER

## 2025-01-31 RX ORDER — ONDANSETRON 8 MG/1
8 TABLET, ORALLY DISINTEGRATING ORAL EVERY 8 HOURS PRN
Qty: 20 TABLET | Refills: 1 | Status: SHIPPED | OUTPATIENT
Start: 2025-01-31

## 2025-01-31 RX ORDER — AZITHROMYCIN 250 MG/1
250 TABLET, FILM COATED ORAL TAKE AS DIRECTED
Qty: 6 TABLET | Refills: 0 | Status: SHIPPED | OUTPATIENT
Start: 2025-01-31 | End: 2025-02-05

## 2025-01-31 NOTE — ASSESSMENT & PLAN NOTE
She has some anxiety and depression and following with psychiatry Dr Judy Escobar. Good get up and go and good recall 3 of 3 and good clock Follow with GYN Dr Rama Reed. We discussed her labs. Age-appropriate Counseling:  Discussed preventative medicine issues with patient including regular exercise, healthy diet, stress reduction, adequate sleep and recommended age-appropriate screening studies.  Immunizations reviewed.

## 2025-01-31 NOTE — ASSESSMENT & PLAN NOTE
Improving and discussed decreasing bad carbohydrates, specifically sweets, breads, potatoes, corn and high caloric drinks (juices, sodas, sweet tea).  Also recommend increasing physical activity, ideally 150 minutes aerobic exercise weekly and resistance exercises 2-3x/week.

## 2025-01-31 NOTE — PROGRESS NOTES
Subjective   The ABCs of the Annual Wellness Visit  Medicare Wellness Visit      Kelly Waldrop is a 74 y.o. patient who presents for a Medicare Wellness Visit. She has some increased sinus pressure and some post nasal drip. She has elevated lipase. She has had some nausea and viral syndrome at went to ER.She has had some palpitations She has some sinus pressure and congestion.     The following portions of the patient's history were reviewed and   updated as appropriate: allergies, current medications, past family history, past medical history, past social history, past surgical history, and problem list.    Compared to one year ago, the patient's physical   health is better.  Compared to one year ago, the patient's mental   health is the same.    Recent Hospitalizations:  She was not admitted to the hospital during the last year.     Current Medical Providers:  Patient Care Team:  Hernan Hoover MD as PCP - General (Internal Medicine)  Rama Reed MD as Consulting Physician (Obstetrics and Gynecology)  Elan Chirinos MD as Consulting Physician (Urology)  Ramses Steel MD as Consulting Physician (Colon and Rectal Surgery)  Judy Escobar MD (Psychiatry)    Outpatient Medications Prior to Visit   Medication Sig Dispense Refill    B Complex Vitamins (Vitamin B Complex) tablet Take 0.5 tablets by mouth 3 (Three) Times a Day.      brompheniramine-pseudoephedrine-DM 30-2-10 MG/5ML syrup Take 2.5 mL by mouth 4 (Four) Times a Day As Needed for Allergies. 118 mL 0    busPIRone (BUSPAR) 15 MG tablet Take 1 tablet by mouth 2 (Two) Times a Day. Pt states she takes 1/3 of a 15 mg tab bid      CALCIUM CITRATE PO Take 1 tablet by mouth Daily.      Cholecalciferol (Vitamin D3) 25 MCG (1000 UT) capsule Take 1 capsule by mouth Daily.      estradiol (ESTRACE) 0.1 MG/GM vaginal cream Insert 2 g into the vagina 2 (Two) Times a Week.      famotidine (PEPCID) 20 MG tablet Take 1 tablet by mouth. As needed       fluticasone (CUTIVATE) 0.05 % cream Apply 1 Application topically to the appropriate area as directed Daily As Needed.      fluticasone (CUTIVATE) 0.05 % cream Apply 1 application  topically to the appropriate area as directed 2 (Two) Times a Day. 30 g 0    guaiFENesin (MUCINEX) 600 MG 12 hr tablet Take  by mouth. As needed      multivitamin (THERAGRAN) tablet tablet Take 1 tablet by mouth Daily. [place]      oxazepam (SERAX) 10 MG capsule Take 1 capsule by mouth 3 (Three) Times a Day. 1 q am,  1 q afternoon, 2 qhs      PARoxetine (PAXIL) 10 MG tablet Take 1 tablet by mouth Every Morning. Take 1/4 every other day      prochlorperazine (COMPAZINE) 10 MG tablet Take 1 tablet by mouth Every 6 (Six) Hours As Needed for Nausea or Vomiting. 8 tablet 0    promethazine (PHENERGAN) 25 MG suppository Insert 1 suppository into the rectum Every 6 (Six) Hours As Needed for Nausea or Vomiting. 12 suppository 0    triamcinolone (KENALOG) 0.1 % ointment Apply 1 application  topically to the appropriate area as directed 2 (Two) Times a Day. 15 g 0    VITAMIN E-400 PO Take 1 tablet by mouth 2 (Two) Times a Day.      zolpidem (AMBIEN) 10 MG tablet Take 1 tablet by mouth At Night As Needed. (Patient taking differently: Take 1 tablet by mouth At Night As Needed. As needed)       No facility-administered medications prior to visit.     No opioid medication identified on active medication list. I have reviewed chart for other potential  high risk medication/s and harmful drug interactions in the elderly.      Aspirin is not on active medication list.  Aspirin use is not indicated based on review of current medical condition/s. Risk of harm outweighs potential benefits.  .    Patient Active Problem List   Diagnosis    Dermatitis    Anxiety disorder    Urinary tract infectious disease    Irritable bowel syndrome without diarrhea    Renal stone    Impaired fasting glucose    Hyperlipidemia LDL goal <100    Osteoporosis of vertebra     "Premature heartbeats    Medicare annual wellness visit, subsequent    At moderate risk for fall    Projectile vomiting with nausea     Advance Care Planning Advance Directive is on file.  ACP discussion was held with the patient during this visit. Patient has an advance directive in EMR which is still valid.             Objective   Vitals:    25 1422   BP: 114/64   Pulse: 70   Temp: 98 °F (36.7 °C)   Weight: 42.2 kg (93 lb)   Height: 157.5 cm (62.01\")   PainSc: 0-No pain       Estimated body mass index is 17.01 kg/m² as calculated from the following:    Height as of this encounter: 157.5 cm (62.01\").    Weight as of this encounter: 42.2 kg (93 lb).    BMI is below normal parameters (malnutrition). Recommendations: she saw dietary            Does the patient have evidence of cognitive impairment? No  Lab Results   Component Value Date    TRIG 52 2025    HDL 52 2025     (H) 2025    VLDL 10 2025    HGBA1C 5.70 (H) 2025                                                                                                Health  Risk Assessment    Smoking Status:  Social History     Tobacco Use   Smoking Status Never   Smokeless Tobacco Never     Alcohol Consumption:  Social History     Substance and Sexual Activity   Alcohol Use No       Fall Risk Screen  STEADI Fall Risk Assessment was completed, and patient is at LOW risk for falls.Assessment completed on:2025    Depression Screening   Little interest or pleasure in doing things? Not at all   Feeling down, depressed, or hopeless? Not at all   PHQ-2 Total Score 0      Health Habits and Functional and Cognitive Screenin/31/2025     2:23 PM   Functional & Cognitive Status   Do you have difficulty preparing food and eating? No   Do you have difficulty bathing yourself, getting dressed or grooming yourself? No   Do you have difficulty using the toilet? No   Do you have difficulty moving around from place to place? No   Do " you have trouble with steps or getting out of a bed or a chair? No   Current Diet Well Balanced Diet   Dental Exam Up to date   Eye Exam Up to date   Exercise (times per week) 6 times per week   Current Exercises Include Treadmill;Weightlifting   Do you need help using the phone?  No   Are you deaf or do you have serious difficulty hearing?  No   Do you need help to go to places out of walking distance? No   Do you need help shopping? No   Do you need help preparing meals?  No   Do you need help with housework?  No   Do you need help with laundry? No   Do you need help taking your medications? No   Do you need help managing money? No   Do you ever drive or ride in a car without wearing a seat belt? No   Have you felt unusual stress, anger or loneliness in the last month? No   Who do you live with? Spouse   If you need help, do you have trouble finding someone available to you? No   Have you been bothered in the last four weeks by sexual problems? No   Do you have difficulty concentrating, remembering or making decisions? No           Age-appropriate Screening Schedule:  Refer to the list below for future screening recommendations based on patient's age, sex and/or medical conditions. Orders for these recommended tests are listed in the plan section. The patient has been provided with a written plan.    Health Maintenance List  Health Maintenance   Topic Date Due    DXA SCAN  Never done    TDAP/TD VACCINES (2 - Td or Tdap) 12/01/2017    COVID-19 Vaccine (5 - 2024-25 season) 09/01/2024    LIPID PANEL  01/28/2026    ANNUAL WELLNESS VISIT  01/31/2026    BMI FOLLOWUP  01/31/2026    MAMMOGRAM  06/12/2026    COLORECTAL CANCER SCREENING  10/13/2033    HEPATITIS C SCREENING  Completed    INFLUENZA VACCINE  Completed    Pneumococcal Vaccine 65+  Completed    ZOSTER VACCINE  Completed                                                                                                                                             "    CMS Preventative Services Quick Reference  Risk Factors Identified During Encounter  Depression/Dysphoria:  following withpsychiatry   Fall Risk-High or Moderate: Discussed Fall Prevention in the home and Information on Fall Prevention Shared in After Visit Summary  Polypharmacy: Medication List reviewed    The above risks/problems have been discussed with the patient.  Pertinent information has been shared with the patient in the After Visit Summary.  An After Visit Summary and PPPS were made available to the patient.    Follow Up:   Next Medicare Wellness visit to be scheduled in 1 year.         Additional E&M Note during same encounter follows:  Patient has additional, significant, and separately identifiable condition(s)/problem(s) that require work above and beyond the Medicare Wellness Visit     Chief Complaint  Annual Exam (Labs complete) and Sinus Problem    Subjective   HPI  Samantha is also being seen today for an annual adult preventative physical exam.  and Samantha is also being seen today for additional medical problem/s.    Review of Systems   HENT:  Positive for postnasal drip and sinus pressure.    Cardiovascular:  Positive for palpitations.   Gastrointestinal:  Positive for nausea. Negative for abdominal pain, blood in stool, constipation and diarrhea.   Psychiatric/Behavioral:  Positive for dysphoric mood and sleep disturbance. Negative for decreased concentration. The patient is nervous/anxious.               Objective   Vital Signs:  /64   Pulse 70   Temp 98 °F (36.7 °C)   Ht 157.5 cm (62.01\")   Wt 42.2 kg (93 lb)   BMI 17.01 kg/m²   Physical Exam  Vitals and nursing note reviewed.   Constitutional:       Appearance: Normal appearance. She is well-developed.   HENT:      Head: Normocephalic.      Right Ear: Tympanic membrane and ear canal normal.      Left Ear: Tympanic membrane and ear canal normal.      Nose: Congestion present.   Eyes:      Extraocular Movements: Extraocular " movements intact.      Conjunctiva/sclera: Conjunctivae normal.   Neck:      Vascular: No carotid bruit.   Cardiovascular:      Rate and Rhythm: Normal rate and regular rhythm.      Heart sounds: Normal heart sounds.   Pulmonary:      Effort: Pulmonary effort is normal. No respiratory distress.      Breath sounds: Normal breath sounds.   Abdominal:      General: Bowel sounds are normal.      Palpations: Abdomen is soft.      Tenderness: There is no abdominal tenderness.   Skin:     General: Skin is warm and dry.   Neurological:      General: No focal deficit present.      Mental Status: She is alert and oriented to person, place, and time.      Comments: Good get up and go and good recall 3 of 3 and good clock    Psychiatric:         Behavior: Behavior normal.                       Assessment and Plan            Medicare annual wellness visit, subsequent  She has some anxiety and depression and following with psychiatry Dr Judy Escobar. Good get up and go and good recall 3 of 3 and good clock Follow with GYN Dr Rama Reed. We discussed her labs. Age-appropriate Counseling:  Discussed preventative medicine issues with patient including regular exercise, healthy diet, stress reduction, adequate sleep and recommended age-appropriate screening studies.  Immunizations reviewed.           Hyperlipidemia LDL goal <100    Increase fiber and more exercise          Impaired fasting glucose  Improving and discussed decreasing bad carbohydrates, specifically sweets, breads, potatoes, corn and high caloric drinks (juices, sodas, sweet tea).  Also recommend increasing physical activity, ideally 150 minutes aerobic exercise weekly and resistance exercises 2-3x/week.        Renal stone   Acute issue of several kidney stones and reduce oxalates and increase water          Projectile vomiting with nausea  Acute issue and recently in ER> Give zofran.   Orders:    ondansetron ODT (ZOFRAN-ODT) 8 MG disintegrating tablet; Place 1  tablet on the tongue Every 8 (Eight) Hours As Needed for Nausea or Vomiting.    Premature heartbeats  Acute issue and see Cardiology        Tachycardia  Acute issue and see Cardiology       Protein-calorie malnutrition, unspecified severity  Patient's Body mass index is 17.01 kg/m².  BMI indicates mild protein-calorie malnutrition with health conditions related to an underlying condition that include more protein  . Weight issue is unchanged. BMI is is below average; BMI management plan is completed.  BMI management for patient includes the following:  dietician  .         Mitral valve prolapse  Send to Cardiology   Orders:    Ambulatory Referral to Cardiology    SVT (supraventricular tachycardia)  Acute episode with recent ER visit and establish Dr Tamayo   Orders:    Ambulatory Referral to Cardiology    Sinus pressure  Acute issue and use flonase and allegra        Acute non-recurrent maxillary sinusitis  Add z pack   Orders:    azithromycin (ZITHROMAX) 250 MG tablet; Take 1 tablet by mouth Take As Directed for 5 days. Take 2 tablets the first day, then 1 tablet daily for 4 days.            Follow Up   Return in about 6 months (around 7/31/2025) for Recheck.  Patient was given instructions and counseling regarding her condition or for health maintenance advice. Please see specific information pulled into the AVS if appropriate.

## 2025-01-31 NOTE — ASSESSMENT & PLAN NOTE
Acute issue and recently in ER> Give zofran.   Orders:    ondansetron ODT (ZOFRAN-ODT) 8 MG disintegrating tablet; Place 1 tablet on the tongue Every 8 (Eight) Hours As Needed for Nausea or Vomiting.

## 2025-02-04 ENCOUNTER — TELEPHONE (OUTPATIENT)
Dept: INTERNAL MEDICINE | Facility: CLINIC | Age: 75
End: 2025-02-04

## 2025-02-04 ENCOUNTER — APPOINTMENT (OUTPATIENT)
Dept: CT IMAGING | Facility: HOSPITAL | Age: 75
End: 2025-02-04
Payer: MEDICARE

## 2025-02-04 ENCOUNTER — HOSPITAL ENCOUNTER (EMERGENCY)
Facility: HOSPITAL | Age: 75
Discharge: HOME | End: 2025-02-04
Attending: EMERGENCY MEDICINE | Admitting: EMERGENCY MEDICINE
Payer: MEDICARE

## 2025-02-04 ENCOUNTER — APPOINTMENT (OUTPATIENT)
Dept: GENERAL RADIOLOGY | Facility: HOSPITAL | Age: 75
End: 2025-02-04
Payer: MEDICARE

## 2025-02-04 VITALS
TEMPERATURE: 98.5 F | DIASTOLIC BLOOD PRESSURE: 68 MMHG | RESPIRATION RATE: 16 BRPM | OXYGEN SATURATION: 98 % | BODY MASS INDEX: 17.11 KG/M2 | HEIGHT: 62 IN | WEIGHT: 93 LBS | SYSTOLIC BLOOD PRESSURE: 125 MMHG | HEART RATE: 85 BPM

## 2025-02-04 DIAGNOSIS — J18.9 COMMUNITY ACQUIRED PNEUMONIA OF RIGHT LOWER LOBE OF LUNG: Primary | ICD-10-CM

## 2025-02-04 DIAGNOSIS — R00.2 PALPITATIONS: ICD-10-CM

## 2025-02-04 LAB
ALBUMIN SERPL-MCNC: 3.7 G/DL (ref 3.5–5.2)
ALBUMIN/GLOB SERPL: 1.1 G/DL
ALP SERPL-CCNC: 98 U/L (ref 39–117)
ALT SERPL W P-5'-P-CCNC: 21 U/L (ref 1–33)
ANION GAP SERPL CALCULATED.3IONS-SCNC: 11 MMOL/L (ref 5–15)
AST SERPL-CCNC: 23 U/L (ref 1–32)
BACTERIA UR QL AUTO: NORMAL /HPF
BASOPHILS # BLD AUTO: 0.06 10*3/MM3 (ref 0–0.2)
BASOPHILS NFR BLD AUTO: 0.7 % (ref 0–1.5)
BILIRUB SERPL-MCNC: 0.2 MG/DL (ref 0–1.2)
BILIRUB UR QL STRIP: NEGATIVE
BUN SERPL-MCNC: 16 MG/DL (ref 8–23)
BUN/CREAT SERPL: 28.6 (ref 7–25)
CALCIUM SPEC-SCNC: 9.2 MG/DL (ref 8.6–10.5)
CHLORIDE SERPL-SCNC: 100 MMOL/L (ref 98–107)
CLARITY UR: CLEAR
CO2 SERPL-SCNC: 29 MMOL/L (ref 22–29)
COLOR UR: YELLOW
CREAT SERPL-MCNC: 0.56 MG/DL (ref 0.57–1)
D DIMER PPP FEU-MCNC: 0.84 MCGFEU/ML (ref 0–0.74)
DEPRECATED RDW RBC AUTO: 45.4 FL (ref 37–54)
EGFRCR SERPLBLD CKD-EPI 2021: 95.9 ML/MIN/1.73
EOSINOPHIL # BLD AUTO: 0.06 10*3/MM3 (ref 0–0.4)
EOSINOPHIL NFR BLD AUTO: 0.7 % (ref 0.3–6.2)
ERYTHROCYTE [DISTWIDTH] IN BLOOD BY AUTOMATED COUNT: 13.1 % (ref 12.3–15.4)
FLUAV RNA RESP QL NAA+PROBE: NOT DETECTED
FLUBV RNA RESP QL NAA+PROBE: NOT DETECTED
GLOBULIN UR ELPH-MCNC: 3.4 GM/DL
GLUCOSE SERPL-MCNC: 117 MG/DL (ref 65–99)
GLUCOSE UR STRIP-MCNC: NEGATIVE MG/DL
HCT VFR BLD AUTO: 41.2 % (ref 34–46.6)
HGB BLD-MCNC: 13.2 G/DL (ref 12–15.9)
HGB UR QL STRIP.AUTO: ABNORMAL
HYALINE CASTS UR QL AUTO: NORMAL /LPF
IMM GRANULOCYTES # BLD AUTO: 0.04 10*3/MM3 (ref 0–0.05)
IMM GRANULOCYTES NFR BLD AUTO: 0.4 % (ref 0–0.5)
KETONES UR QL STRIP: NEGATIVE
LEUKOCYTE ESTERASE UR QL STRIP.AUTO: NEGATIVE
LYMPHOCYTES # BLD AUTO: 1.1 10*3/MM3 (ref 0.7–3.1)
LYMPHOCYTES NFR BLD AUTO: 12.2 % (ref 19.6–45.3)
MAGNESIUM SERPL-MCNC: 2.2 MG/DL (ref 1.6–2.4)
MCH RBC QN AUTO: 30.2 PG (ref 26.6–33)
MCHC RBC AUTO-ENTMCNC: 32 G/DL (ref 31.5–35.7)
MCV RBC AUTO: 94.3 FL (ref 79–97)
MONOCYTES # BLD AUTO: 0.74 10*3/MM3 (ref 0.1–0.9)
MONOCYTES NFR BLD AUTO: 8.2 % (ref 5–12)
NEUTROPHILS NFR BLD AUTO: 7.04 10*3/MM3 (ref 1.7–7)
NEUTROPHILS NFR BLD AUTO: 77.8 % (ref 42.7–76)
NITRITE UR QL STRIP: NEGATIVE
NRBC BLD AUTO-RTO: 0 /100 WBC (ref 0–0.2)
NT-PROBNP SERPL-MCNC: 204.6 PG/ML (ref 0–900)
PH UR STRIP.AUTO: 6.5 [PH] (ref 5–8)
PLATELET # BLD AUTO: 312 10*3/MM3 (ref 140–450)
PMV BLD AUTO: 9.7 FL (ref 6–12)
POTASSIUM SERPL-SCNC: 4.1 MMOL/L (ref 3.5–5.2)
PROT SERPL-MCNC: 7.1 G/DL (ref 6–8.5)
PROT UR QL STRIP: NEGATIVE
QT INTERVAL: 358 MS
QTC INTERVAL: 454 MS
RBC # BLD AUTO: 4.37 10*6/MM3 (ref 3.77–5.28)
RBC # UR STRIP: NORMAL /HPF
REF LAB TEST METHOD: NORMAL
SARS-COV-2 RNA RESP QL NAA+PROBE: NOT DETECTED
SODIUM SERPL-SCNC: 140 MMOL/L (ref 136–145)
SP GR UR STRIP: 1.01 (ref 1–1.03)
SQUAMOUS #/AREA URNS HPF: NORMAL /HPF
T4 FREE SERPL-MCNC: 1.08 NG/DL (ref 0.92–1.68)
TROPONIN T SERPL HS-MCNC: <6 NG/L
TSH SERPL DL<=0.05 MIU/L-ACNC: 1.58 UIU/ML (ref 0.27–4.2)
UROBILINOGEN UR QL STRIP: ABNORMAL
WBC # UR STRIP: NORMAL /HPF
WBC NRBC COR # BLD AUTO: 9.04 10*3/MM3 (ref 3.4–10.8)

## 2025-02-04 PROCEDURE — 87636 SARSCOV2 & INF A&B AMP PRB: CPT | Performed by: EMERGENCY MEDICINE

## 2025-02-04 PROCEDURE — 84484 ASSAY OF TROPONIN QUANT: CPT | Performed by: EMERGENCY MEDICINE

## 2025-02-04 PROCEDURE — 80053 COMPREHEN METABOLIC PANEL: CPT | Performed by: EMERGENCY MEDICINE

## 2025-02-04 PROCEDURE — 85379 FIBRIN DEGRADATION QUANT: CPT | Performed by: EMERGENCY MEDICINE

## 2025-02-04 PROCEDURE — 71275 CT ANGIOGRAPHY CHEST: CPT

## 2025-02-04 PROCEDURE — 83735 ASSAY OF MAGNESIUM: CPT | Performed by: EMERGENCY MEDICINE

## 2025-02-04 PROCEDURE — 84443 ASSAY THYROID STIM HORMONE: CPT | Performed by: EMERGENCY MEDICINE

## 2025-02-04 PROCEDURE — 83880 ASSAY OF NATRIURETIC PEPTIDE: CPT | Performed by: EMERGENCY MEDICINE

## 2025-02-04 PROCEDURE — 36415 COLL VENOUS BLD VENIPUNCTURE: CPT

## 2025-02-04 PROCEDURE — 81001 URINALYSIS AUTO W/SCOPE: CPT | Performed by: EMERGENCY MEDICINE

## 2025-02-04 PROCEDURE — 25510000001 IOPAMIDOL PER 1 ML: Performed by: EMERGENCY MEDICINE

## 2025-02-04 PROCEDURE — 93005 ELECTROCARDIOGRAM TRACING: CPT | Performed by: EMERGENCY MEDICINE

## 2025-02-04 PROCEDURE — 71045 X-RAY EXAM CHEST 1 VIEW: CPT

## 2025-02-04 PROCEDURE — 84439 ASSAY OF FREE THYROXINE: CPT | Performed by: EMERGENCY MEDICINE

## 2025-02-04 PROCEDURE — 85025 COMPLETE CBC W/AUTO DIFF WBC: CPT | Performed by: EMERGENCY MEDICINE

## 2025-02-04 PROCEDURE — 99285 EMERGENCY DEPT VISIT HI MDM: CPT

## 2025-02-04 RX ORDER — DOXYCYCLINE 100 MG/1
100 CAPSULE ORAL 2 TIMES DAILY
Qty: 14 CAPSULE | Refills: 0 | Status: SHIPPED | OUTPATIENT
Start: 2025-02-04 | End: 2025-02-11 | Stop reason: SDUPTHER

## 2025-02-04 RX ORDER — IOPAMIDOL 755 MG/ML
75 INJECTION, SOLUTION INTRAVASCULAR
Status: COMPLETED | OUTPATIENT
Start: 2025-02-04 | End: 2025-02-04

## 2025-02-04 RX ORDER — DOXYCYCLINE 100 MG/1
100 CAPSULE ORAL ONCE
Status: COMPLETED | OUTPATIENT
Start: 2025-02-04 | End: 2025-02-04

## 2025-02-04 RX ADMIN — DOXYCYCLINE 100 MG: 100 CAPSULE ORAL at 16:45

## 2025-02-04 RX ADMIN — IOPAMIDOL 75 ML: 755 INJECTION, SOLUTION INTRAVENOUS at 15:43

## 2025-02-04 NOTE — TELEPHONE ENCOUNTER
PATIENT IS CONCERNED ABOUT HER HEART RATE SHE DID SEE DR MARX FOR HER HER CONGESTION LAST WEEK BUT SHE WORRIED IT COULD BE PNEUMONIA . PT STATED HER HEART RATE WENT UP  STAYS  AT THIS RATE ALL DAY THEN WHEN SHE GOES TO BED IT STARTS TO SETTLE DOWN.

## 2025-02-04 NOTE — TELEPHONE ENCOUNTER
LVM for a return call. HUB can relay, If heart rate better than should come back in and get seen for heart and lungs. And if worsens go to to ER still

## 2025-02-04 NOTE — ED PROVIDER NOTES
"Subjective   History of Present Illness  74-year-old female presents for evaluation of palpitations and rapid heart rate.  Of note, the patient tells me that she has been experiencing \"cold-like symptoms\" including mild cough and congestion over the past couple of weeks.  For the past 4 days she has also been experiencing intermittent episodes of palpitations and rapid heart rate.  She notes that her heart rate has been as high as 150.  She did recently start drinking a new tea and did not know if this could be a contributing factor.  She denies any fevers.  She denies any known exposures to anyone with COVID-19.  Given her ongoing symptoms, she was concerned and came here to the ED to be evaluated.      Review of Systems   HENT:  Positive for congestion.    Respiratory:  Positive for cough.    Cardiovascular:  Positive for palpitations.   All other systems reviewed and are negative.      Past Medical History:   Diagnosis Date    Acute pharyngitis     Anxiety     Depression     Hyperlipidemia LDL goal <100 2/21/2022    Influenza-like symptoms     Kidney stone     Mitral valve prolapse     Osteoporosis of vertebra 2/21/2022    Pneumonia     2021 with right lung empyema and pleural effsion with thoracentisis for loculated fluid per Dr Calvin and DR Rizo    Premature heartbeats 2/21/2022    Had spell during hospitalization     Renal stone 2/21/2022    [facility]        Allergies   Allergen Reactions    Benadryl [Diphenhydramine] Other (See Comments)     hyper    Scopolamine Nausea Only and Dizziness    Grass Pollen(K-O-R-T-Swt Vic) Other (See Comments)     congestion    Ondansetron Other (See Comments)     Causes Constipation       Past Surgical History:   Procedure Laterality Date    KIDNEY STONE SURGERY      LUNG SURGERY Right 09/27/2021    she had infection and pleural scraping    TONSILLECTOMY         Family History   Problem Relation Age of Onset    Stroke " Father     Alzheimer's disease Mother     Diabetes Mother     Breast cancer Paternal Grandmother     Ovarian cancer Neg Hx     Uterine cancer Neg Hx     Colon cancer Neg Hx        Social History     Socioeconomic History    Marital status:    Tobacco Use    Smoking status: Never    Smokeless tobacco: Never   Vaping Use    Vaping status: Never Used   Substance and Sexual Activity    Alcohol use: No    Drug use: No    Sexual activity: Yes     Partners: Male           Objective   Physical Exam  Vitals and nursing note reviewed.   Constitutional:       General: She is not in acute distress.     Appearance: Normal appearance. She is well-developed. She is not diaphoretic.      Comments: Nontoxic-appearing female   HENT:      Head: Normocephalic and atraumatic.   Cardiovascular:      Rate and Rhythm: Regular rhythm. Tachycardia present.      Heart sounds: Normal heart sounds. No murmur heard.     No friction rub. No gallop.   Pulmonary:      Effort: Pulmonary effort is normal. No respiratory distress.      Breath sounds: Normal breath sounds. No wheezing or rales.   Abdominal:      General: Bowel sounds are normal. There is no distension.      Palpations: Abdomen is soft. There is no mass.      Tenderness: There is no abdominal tenderness. There is no guarding or rebound.   Musculoskeletal:         General: Normal range of motion.      Cervical back: Neck supple.      Right lower leg: No edema.      Left lower leg: No edema.   Skin:     General: Skin is warm and dry.      Findings: No erythema or rash.   Neurological:      Mental Status: She is alert and oriented to person, place, and time.   Psychiatric:         Mood and Affect: Mood normal.         Thought Content: Thought content normal.         Judgment: Judgment normal.         Procedures           ED Course  ED Course as of 02/04/25 1833   Tue Feb 04, 2025   1402 74-year-old female presents for evaluation of palpitations and rapid heart rate.  Of note, the  "patient tells me that she has been experiencing \"cold-like symptoms\" including mild cough and congestion over the past couple of weeks.  For the past 4 days she has been experiencing intermittent episodes of palpitations and rapid heart rate.  She notes that her heart rate has been as high as 150.  She did recently start drinking a new tea and did not know if this could be a contributing factor.  Given her symptoms, she was concerned and came here to the ED to be evaluated.  On arrival, the patient is nontoxic-appearing.  But on exam.  Differential diagnosis is quite broad.  We will obtain labs and imaging, and we will reassess following initial interventions. [DD]   1403 I personally and independently viewed the patient's x-ray images myself, and I am in agreement with the radiologist's reading for final interpretation, particularly regarding pneumonia. [DD]   1526 Labs remarkable for elevated D-dimer.  Labs are otherwise unrevealing. [DD]   1549 I personally and independently reviewed the patient's CT images and findings, and I am in agreement with the radiologist regarding CT interpretation--particularly regarding pneumonia in right lung base. [DD]   1549 Given the patient's well appearance and overall clinical picture, feel that she can be safely discharged and managed on an outpatient basis at this point.  First dose of doxycycline given here in the ED. [DD]   1550 Initial EKG interpreted independently by me revealed normal sinus rhythm with a heart rate of 97 and no ST segments suggestive of or concerning for ischemia with normal [place] and QT intervals.  No EKG evidence of Brugada syndrome or hypertrophic cardiomyopathy noted. [DD]   1618 Repeat EKG revealed normal sinus rhythm with a heart rate of 93 and no ST segments suggestive of or concerning for ischemia. [DD]   1636 Upon reevaluation, the patient looks and feels well.  I feel that she can be safely discharged and managed on an outpatient basis at this " point.  Prescription for doxycycline.  Given her reported episodes of tachycardia, I have referred her to our heart and valve clinic for outpatient cardiac monitoring.  She will follow-up within the next week.  Agreeable with plan and given appropriate strict return precautions. [DD]      ED Course User Index  [DD] Nikunj Cyr MD                                             Recent Results (from the past 24 hours)   COVID-19 and FLU A/B PCR, 1 HR TAT - Swab, Nasopharynx    Collection Time: 02/04/25  1:47 PM    Specimen: Nasopharynx; Swab   Result Value Ref Range    COVID19 Not Detected Not Detected - Ref. Range    Influenza A PCR Not Detected Not Detected    Influenza B PCR Not Detected Not Detected   Urinalysis With Culture If Indicated - Urine, Clean Catch    Collection Time: 02/04/25  1:50 PM    Specimen: Urine, Clean Catch   Result Value Ref Range    Color, UA Yellow Yellow, Straw    Appearance, UA Clear Clear    pH, UA 6.5 5.0 - 8.0    Specific Gravity, UA 1.009 1.001 - 1.030    Glucose, UA Negative Negative    Ketones, UA Negative Negative    Bilirubin, UA Negative Negative    Blood, UA Trace (A) Negative    Protein, UA Negative Negative    Leuk Esterase, UA Negative Negative    Nitrite, UA Negative Negative    Urobilinogen, UA 0.2 E.U./dL 0.2 - 1.0 E.U./dL   Urinalysis, Microscopic Only - Urine, Clean Catch    Collection Time: 02/04/25  1:50 PM    Specimen: Urine, Clean Catch   Result Value Ref Range    RBC, UA 0-2 None Seen, 0-2 /HPF    WBC, UA 0-2 None Seen, 0-2 /HPF    Bacteria, UA None Seen None Seen, Trace /HPF    Squamous Epithelial Cells, UA None Seen None Seen, 0-2 /HPF    Hyaline Casts, UA None Seen 0 - 6 /LPF    Methodology Automated Microscopy    ECG 12 Lead Tachycardia    Collection Time: 02/04/25  1:56 PM   Result Value Ref Range    QT Interval 358 ms    QTC Interval 454 ms   Comprehensive Metabolic Panel    Collection Time: 02/04/25  1:58 PM    Specimen: Blood   Result Value Ref Range     Glucose 117 (H) 65 - 99 mg/dL    BUN 16 8 - 23 mg/dL    Creatinine 0.56 (L) 0.57 - 1.00 mg/dL    Sodium 140 136 - 145 mmol/L    Potassium 4.1 3.5 - 5.2 mmol/L    Chloride 100 98 - 107 mmol/L    CO2 29.0 22.0 - 29.0 mmol/L    Calcium 9.2 8.6 - 10.5 mg/dL    Total Protein 7.1 6.0 - 8.5 g/dL    Albumin 3.7 3.5 - 5.2 g/dL    ALT (SGPT) 21 1 - 33 U/L    AST (SGOT) 23 1 - 32 U/L    Alkaline Phosphatase 98 39 - 117 U/L    Total Bilirubin 0.2 0.0 - 1.2 mg/dL    Globulin 3.4 gm/dL    A/G Ratio 1.1 g/dL    BUN/Creatinine Ratio 28.6 (H) 7.0 - 25.0    Anion Gap 11.0 5.0 - 15.0 mmol/L    eGFR 95.9 >60.0 mL/min/1.73   T4, Free    Collection Time: 02/04/25  1:58 PM    Specimen: Blood   Result Value Ref Range    Free T4 1.08 0.92 - 1.68 ng/dL   TSH    Collection Time: 02/04/25  1:58 PM    Specimen: Blood   Result Value Ref Range    TSH 1.580 0.270 - 4.200 uIU/mL   Magnesium    Collection Time: 02/04/25  1:58 PM    Specimen: Blood   Result Value Ref Range    Magnesium 2.2 1.6 - 2.4 mg/dL   High Sensitivity Troponin T    Collection Time: 02/04/25  1:58 PM    Specimen: Blood   Result Value Ref Range    HS Troponin T <6 <14 ng/L   BNP    Collection Time: 02/04/25  1:58 PM    Specimen: Blood   Result Value Ref Range    proBNP 204.6 0.0 - 900.0 pg/mL   D-dimer, Quantitative    Collection Time: 02/04/25  1:58 PM    Specimen: Blood   Result Value Ref Range    D-Dimer, Quantitative 0.84 (H) 0.00 - 0.74 MCGFEU/mL   CBC Auto Differential    Collection Time: 02/04/25  1:58 PM    Specimen: Blood   Result Value Ref Range    WBC 9.04 3.40 - 10.80 10*3/mm3    RBC 4.37 3.77 - 5.28 10*6/mm3    Hemoglobin 13.2 12.0 - 15.9 g/dL    Hematocrit 41.2 34.0 - 46.6 %    MCV 94.3 79.0 - 97.0 fL    MCH 30.2 26.6 - 33.0 pg    MCHC 32.0 31.5 - 35.7 g/dL    RDW 13.1 12.3 - 15.4 %    RDW-SD 45.4 37.0 - 54.0 fl    MPV 9.7 6.0 - 12.0 fL    Platelets 312 140 - 450 10*3/mm3    Neutrophil % 77.8 (H) 42.7 - 76.0 %    Lymphocyte % 12.2 (L) 19.6 - 45.3 %    Monocyte % 8.2  "5.0 - 12.0 %    Eosinophil % 0.7 0.3 - 6.2 %    Basophil % 0.7 0.0 - 1.5 %    Immature Grans % 0.4 0.0 - 0.5 %    Neutrophils, Absolute 7.04 (H) 1.70 - 7.00 10*3/mm3    Lymphocytes, Absolute 1.10 0.70 - 3.10 10*3/mm3    Monocytes, Absolute 0.74 0.10 - 0.90 10*3/mm3    Eosinophils, Absolute 0.06 0.00 - 0.40 10*3/mm3    Basophils, Absolute 0.06 0.00 - 0.20 10*3/mm3    Immature Grans, Absolute 0.04 0.00 - 0.05 10*3/mm3    nRBC 0.0 0.0 - 0.2 /100 WBC   ECG 12 Lead Other; verbal order    Collection Time: 02/04/25  4:06 PM   Result Value Ref Range    QT Interval 376 ms    QTC Interval 467 ms     Note: In addition to lab results from this visit, the labs listed above may include labs taken at another facility or during a different encounter within the last 24 hours. Please correlate lab times with ED admission and discharge times for further clarification of the services performed during this visit.    CT Angiogram Chest   Final Result   Impression:   1.No evidence of pulmonary embolism.   2.Right lower lung pneumonia.            Electronically Signed: Jose Velasquez MD     2/4/2025 3:54 PM EST     Workstation ID: [id number]      XR Chest 1 View   Final Result   Impression:   Parenchymal opacities at the right lung base which are new from 1/4/2025 chest x-ray, compatible with pneumonia.         Electronically Signed: Liban Hu MD     2/4/2025 1:51 PM EST     Workstation ID: [id number]        Vitals:    02/04/25 1311 02/04/25 1318 02/04/25 1522 02/04/25 1649   BP:  111/66 122/68 125/68   Pulse: 103  (!) 133 85   Resp: 16      Temp: 98.5 °F (36.9 °C)      SpO2: 96%  96% 98%   Weight: 42.2 kg (93 lb)      Height: 157.5 cm (62\")        Medications   iopamidol (ISOVUE-370) 76 % injection 75 mL (75 mL Intravenous Given 2/4/25 1543)   doxycycline (MONODOX) capsule 100 mg (100 mg Oral Given 2/4/25 7518)     ECG/EMG Results (last 24 hours)       Procedure Component Value Units Date/Time    ECG 12 Lead Other; verbal order " [[id number]] Collected: 02/04/25 1606     Updated: 02/04/25 1607     QT Interval 376 ms      QTC Interval 467 ms     Narrative:      Test Reason : Other~  Blood Pressure :   */*   mmHG  Vent. Rate :  93 BPM     Atrial Rate :  93 BPM     P-R Int : 144 ms          QRS Dur :  84 ms      QT Int : 376 ms       P-R-T Axes :  77  31  60 degrees    QTcB Int : 467 ms    Normal sinus rhythm  Possible Left atrial enlargement  Borderline ECG  When compared with ECG of 04-Feb-2025 13:56, (Unconfirmed)  No significant change was found    Referred By: ED MD           Confirmed By:           ECG 12 Lead Other; verbal order   Preliminary Result   Test Reason : Other~   Blood Pressure :   */*   mmHG   Vent. Rate :  93 BPM     Atrial Rate :  93 BPM      P-R Int : 144 ms          QRS Dur :  84 ms       QT Int : 376 ms       P-R-T Axes :  77  31  60 degrees     QTcB Int : 467 ms      Normal sinus rhythm   Possible Left atrial enlargement   Borderline ECG   When compared with ECG of 04-Feb-2025 13:56, (Unconfirmed)   No significant change was found      Referred By: ED MD           Confirmed By:       ECG 12 Lead Tachycardia   Final Result   Test Reason : Tachycardia   Blood Pressure :   */*   mmHG   Vent. Rate :  97 BPM     Atrial Rate :  97 BPM      P-R Int : 134 ms          QRS Dur :  72 ms       QT Int : 358 ms       P-R-T Axes :  41  39  66 degrees     QTcB Int : 454 ms      Normal sinus rhythm   Normal ECG   When compared with ECG of 04-Jan-2025 15:29,   No significant change was found   Confirmed by MD Ger, Paul ([id number]) on 2/4/2025 6:29:42 PM      Referred By: EDMD           Confirmed By: Paul Cyr MD                    Medical Decision Making  Problems Addressed:  Community acquired pneumonia of right lower lobe of lung: complicated acute illness or injury  Palpitations: complicated acute illness or injury    Amount and/or Complexity of Data Reviewed  Labs: ordered.  Radiology: ordered.  ECG/medicine tests:  ordered.    Risk  Prescription drug management.        Final diagnoses:   Community acquired pneumonia of right lower lobe of lung   Palpitations       ED Disposition  ED Disposition       ED Disposition   Discharge    Condition   Stable    Comment   --               [facility] CARDIOLOG[facility]  [address]  [address]  [facility] [id number] [phone]  In 3 days      Hernan Hoover MD  [address]  [address]  [place] [id number]  [phone]    In 1 week           Medication List        New Prescriptions      doxycycline 100 MG capsule  Commonly known as: MONODOX  Take 1 capsule by mouth 2 (Two) Times a Day.            Changed      zolpidem 10 MG tablet  Commonly known as: AMBIEN  What changed: additional instructions               Where to Get Your Medications        These medications were sent to [id number] - [place], [place] - [address] [place] [facility] [address] - [phone]  - [phone] FX  [address], [facility] [address]      Phone: [phone]   doxycycline 100 MG capsule            Nikunj Cyr MD  02/04/25 0805

## 2025-02-04 NOTE — TELEPHONE ENCOUNTER
Pt notified, voiced understanding.  She said the highest her heart rate has been today is 103-104.  She is concerned she may have pneumonia and wanted to know if she needed a chest xray.  Please advise.

## 2025-02-06 LAB
QT INTERVAL: 376 MS
QTC INTERVAL: 467 MS

## 2025-02-10 ENCOUNTER — TELEPHONE (OUTPATIENT)
Dept: CARDIOLOGY | Facility: CLINIC | Age: 75
End: 2025-02-10
Payer: MEDICARE

## 2025-02-10 NOTE — TELEPHONE ENCOUNTER
Pt returned call. She states she is still taking the antibiotic given to her for pneumonia. She states the palpitations are getting better than they had been.    I informed pt that Dr. Tamayo is not in the office this week, and 2/18 will be the earliest appt time. She verbalized understanding. Pt informed to call back to clinic, or go back to ER, if palpitations worsen, or other symptoms develop. She verbalized understanding and had no further questions.

## 2025-02-10 NOTE — TELEPHONE ENCOUNTER
"Received voicemail that was transferred from [facility] [place],  on 2/7 after 5pm. In voicemail pt stated that she has a new pt appt with Dr. Tamayo on 2/18, but it worried that is too far out to wait. She states in message she was in the ER on 2/4 and is still having \"fluctuations\" in her heart and wants to know if she can be seen sooner than the 2/18 appt.    I attempted to reach pt for more information. No answer. Left voicemail for pt to return call.    "

## 2025-02-11 ENCOUNTER — OFFICE VISIT (OUTPATIENT)
Dept: INTERNAL MEDICINE | Facility: CLINIC | Age: 75
End: 2025-02-11
Payer: MEDICARE

## 2025-02-11 ENCOUNTER — TELEPHONE (OUTPATIENT)
Dept: CARDIOLOGY | Facility: CLINIC | Age: 75
End: 2025-02-11
Payer: MEDICARE

## 2025-02-11 VITALS
HEART RATE: 90 BPM | OXYGEN SATURATION: 97 % | WEIGHT: 95 LBS | SYSTOLIC BLOOD PRESSURE: 110 MMHG | HEIGHT: 62 IN | BODY MASS INDEX: 17.48 KG/M2 | DIASTOLIC BLOOD PRESSURE: 50 MMHG | TEMPERATURE: 98.4 F

## 2025-02-11 DIAGNOSIS — R09.81 SINUS CONGESTION: ICD-10-CM

## 2025-02-11 DIAGNOSIS — J18.9 PNEUMONIA OF RIGHT LOWER LOBE DUE TO INFECTIOUS ORGANISM: Primary | ICD-10-CM

## 2025-02-11 DIAGNOSIS — R00.2 PALPITATIONS: ICD-10-CM

## 2025-02-11 PROCEDURE — 1126F AMNT PAIN NOTED NONE PRSNT: CPT | Performed by: STUDENT IN AN ORGANIZED HEALTH CARE EDUCATION/TRAINING PROGRAM

## 2025-02-11 PROCEDURE — 1159F MED LIST DOCD IN RCRD: CPT | Performed by: STUDENT IN AN ORGANIZED HEALTH CARE EDUCATION/TRAINING PROGRAM

## 2025-02-11 PROCEDURE — 1160F RVW MEDS BY RX/DR IN RCRD: CPT | Performed by: STUDENT IN AN ORGANIZED HEALTH CARE EDUCATION/TRAINING PROGRAM

## 2025-02-11 PROCEDURE — 99214 OFFICE O/P EST MOD 30 MIN: CPT | Performed by: STUDENT IN AN ORGANIZED HEALTH CARE EDUCATION/TRAINING PROGRAM

## 2025-02-11 RX ORDER — DOXYCYCLINE 100 MG/1
100 CAPSULE ORAL 2 TIMES DAILY
Qty: 6 CAPSULE | Refills: 0 | Status: SHIPPED | OUTPATIENT
Start: 2025-02-11

## 2025-02-11 NOTE — TELEPHONE ENCOUNTER
Patient is aware that appt with [facility] was canceled due to him being ooo. Patient is now going to be seeing [facility] on 2/18/25 at 10AM. Patient verbally understood!

## 2025-02-11 NOTE — PROGRESS NOTES
"Chief Complaint  Kelly Waldrop is a 74 y.o. female presenting for Hospital Follow Up Visit.     Patient has a past medical history of hyperlipidemia, premature heartbeats, mitral valve prolapse, impaired fasting glucose, IBS, kidney stones, anxiety, depression, dermatitis and osteoporosis.    History of Present Illness  Patient is here for emergency room follow-up, accompanied by her .    She was seen in the ED on 2/4/2025 due to concern for palpitations.  She also had been experiencing cold-like respiratory symptoms for 2 weeks with cough, congestion.  Was noted with 4 days of intermittent episodes of palpitations/rapid heart rate.  Heart rate up up to 150 bpm.    EKG in the ED was normal, 97 bpm, no acute changes concerning for ischemia.  D-dimer was mildly elevated at 0.84, also she underwent CTA of her chest, but no sign of pulmonary embolism.  However they did note right lower lung pneumonia.  Based on clinical evaluation they recommended outpatient treatment with doxycycline, initiated in the ED.  They did recommend follow-up with cardiology.    She tells me she had a complicated course in October 2021 with pneumonia, partial lung collapse, and concern for scar tissue afterwards.      Patient tells me she was prescribed Z-Buddy by Dr. Hoover, there was concern for sinus infection when she was seen on 1/31/2025, but she did not pick it up at the time.  She still feels she has some sinus pressure lingering, but it has improved.  Now just clear drainage.    The following portions of the patient's history were reviewed and updated as appropriate: allergies, current medications, past family history, past medical history, past social history, past surgical history, and problem list.    Objective  /50 (BP Location: Right arm, Patient Position: Sitting, Cuff Size: Adult)   Pulse 90   Temp 98.4 °F (36.9 °C) (Infrared)   Ht 157.5 cm (62.01\")   Wt 43.1 kg (95 lb)   LMP 01/28/2006 (Approximate)   " SpO2 97%   BMI 17.37 kg/m²     Physical Exam  Vitals reviewed.   Constitutional:       Appearance: Normal appearance.   HENT:      Head: Normocephalic and atraumatic.      Nose: Nose normal. No congestion.   Eyes:      Extraocular Movements: Extraocular movements intact.      Conjunctiva/sclera: Conjunctivae normal.   Cardiovascular:      Rate and Rhythm: Normal rate and regular rhythm.      Heart sounds: Normal heart sounds. No murmur heard.  Pulmonary:      Effort: Pulmonary effort is normal. No respiratory distress.      Breath sounds: Normal breath sounds. No wheezing.   Musculoskeletal:         General: No swelling.      Cervical back: Neck supple.   Skin:     General: Skin is warm and dry.   Neurological:      Mental Status: She is alert and oriented to person, place, and time. Mental status is at baseline.   Psychiatric:         Behavior: Behavior normal.         Thought Content: Thought content normal.         Assessment/Plan   1. Pneumonia of right lower lobe due to infectious organism  She appears to be recovering well.  Normal lung exam.  Recommend extending her doxycycline another 3 days given the concern for possible sinus infection in addition.  She might be more at risk for recurrence of pneumonia given her history in October 2021.  She is asking about reordering chest x-ray now, I recommend follow-up with PCP in 4-6 weeks, to see how she is doing, potentially repeat chest x-ray then if desired.  Otherwise continue on doxycycline and get back in touch with any worsening symptoms.  - doxycycline (MONODOX) 100 MG capsule; Take 1 capsule by mouth 2 (Two) Times a Day. For 3 additional days  Dispense: 6 capsule; Refill: 0    2. Sinus congestion  Doxycycline should cover sinus infection will    3. Palpitations  No palpitations now.  Could have been triggered also by her pneumonia.  No concern for PE.  She will follow-up as planned with cardiology next week.    Total time spent on chart review, charting and  face-to-face with patient 37 minutes    Return in about 4 weeks (around 3/11/2025) for Recheck w/PCP.    Graeme Alonzo MD  Family Medicine  02/11/2025

## 2025-02-14 NOTE — PROGRESS NOTES
[facility] at [facility]  Cardiology Consultation Note     Kelly Waldrop  1950  Requesting Provider: Hernan Hoover MD  PCP: Hernan Hoover MD    ID:  Kelly Waldrop is a 74 y.o. female who resides in [place], [place].     REASON FOR CONSULTATION:    But heart rate         Dear Dr. Hoover:    Samantha Waldrop is a very nice 74-year-old female who last month noted her pulse to be elevated on Apple Watch.  She became concerned by this.  She was ultimately diagnosed with pneumonia.  After being treated for pneumonia, she feels better, heart rate improved.  EKGs performed in ER visit where pneumonia at diagnosis showed sinus rhythm.  Her Apple Watch has never alarmed for atrial fibrillation in the past.      Past Medical History, Past Surgical History, Family history, Social History, and Medications were all reviewed with the patient today and updated as necessary.       Current Outpatient Medications:     busPIRone (BUSPAR) 15 MG tablet, Take 1 tablet by mouth 2 (Two) Times a Day. Pt states she takes 1/3 of a 15 mg tab bid, Disp: , Rfl:     Cholecalciferol (Vitamin D3) 25 MCG (1000 UT) capsule, Take 1 capsule by mouth Daily., Disp: , Rfl:     fluticasone (CUTIVATE) 0.05 % cream, Apply 1 Application topically to the appropriate area as directed Daily As Needed., Disp: , Rfl:     guaiFENesin (MUCINEX) 600 MG 12 hr tablet, Take  by mouth. As needed, Disp: , Rfl:     oxazepam (SERAX) 10 MG capsule, Take 1 capsule by mouth 3 (Three) Times a Day. 1 q am,  1 q afternoon, 2 qhs, Disp: , Rfl:     PARoxetine (PAXIL) 10 MG tablet, Take 1 tablet by mouth Every Morning. Take 1/4 every other day, Disp: , Rfl:     VITAMIN E-400 PO, Take 1 tablet by mouth 2 (Two) Times a Day. (Patient taking differently: Take 1 tablet by mouth As Needed.), Disp: , Rfl:     zolpidem (AMBIEN) 10 MG tablet, Take 1 tablet by mouth At Night As Needed. (Patient taking differently: Take 1 tablet by mouth At  Night As Needed. As needed), Disp: , Rfl:     famotidine (PEPCID) 20 MG tablet, Take 1 tablet by mouth. As needed (Patient not taking: Reported on 2/18/2025), Disp: , Rfl:     Allergies   Allergen Reactions    Benadryl [Diphenhydramine] Other (See Comments)     hyper    Scopolamine Nausea Only and Dizziness    Grass Pollen(K-O-R-T-Swt Vic) Other (See Comments)     congestion    Ondansetron Other (See Comments)     Causes Constipation         Past Medical History:   Diagnosis Date    Acute pharyngitis     Anxiety     Depression     Hyperlipidemia LDL goal <100 2/21/2022    Influenza-like symptoms     Kidney stone     Mitral valve prolapse     Osteoporosis of vertebra 2/21/2022    Pneumonia     2021 with right lung empyema and pleural effsion with thoracentisis for loculated fluid per Dr Calvin and DR Rizo    Premature heartbeats 2/21/2022    Had spell during hospitalization     Renal stone 2/21/2022    She had multiple stones Bynd  2019 with multiple surgeries        Past Surgical History:   Procedure Laterality Date    KIDNEY STONE SURGERY      LUNG SURGERY Right 09/27/2021    she had infection and pleural scraping    TONSILLECTOMY         Family History   Problem Relation Age of Onset    Stroke Father     Alzheimer's disease Mother     Diabetes Mother     Breast cancer Paternal Grandmother     Ovarian cancer Neg Hx     Uterine cancer Neg Hx     Colon cancer Neg Hx        Social History     Tobacco Use    Smoking status: Never    Smokeless tobacco: Never   Substance Use Topics    Alcohol use: No       Review of Systems   Constitutional: Negative for malaise/fatigue.   Eyes:  Negative for vision loss in left eye and vision loss in right eye.   Cardiovascular: Negative.  Negative for chest pain, dyspnea on exertion, near-syncope, orthopnea, palpitations, paroxysmal nocturnal dyspnea and syncope.   Respiratory: Negative.     Musculoskeletal:  Negative for myalgias.   Neurological:  Negative for brief paralysis,  "excessive daytime sleepiness, focal weakness, numbness, paresthesias and weakness.   All other systems reviewed and are negative.              /50 (BP Location: Left arm, Patient Position: Sitting, Cuff Size: Adult)   Pulse 90   Ht 157.5 cm (62\")   Wt 42.4 kg (93 lb 6.4 oz)   LMP 01/28/2006 (Approximate)   SpO2 96%   BMI 17.08 kg/m²        Constitutional:       Appearance: Healthy appearance.   Eyes:      General: No scleral icterus.  Neck:      Thyroid: No thyroid mass.      Vascular: No carotid bruit or JVD. JVD normal.   Pulmonary:      Effort: Pulmonary effort is normal.      Breath sounds: Normal breath sounds.   Cardiovascular:      Normal rate. Regular rhythm.      Murmurs: There is no murmur.      No gallop.    Edema:     Peripheral edema absent.   Skin:     General: Skin is warm. There is no cyanosis.   Neurological:      General: No focal deficit present.      Mental Status: Alert.   Psychiatric:         Attention and Perception: Attention normal.           EKG (2/6/2025): Sinus rhythm at 93 bpm.  Borderline left atrial enlargement.    Lab Results   Component Value Date    CHOLESTEROL 176 01/28/2025    HDL CHOL 52 01/28/2025    LDL CHOL 114 (H) 01/28/2025    LDL/HDL RATIO 2.18 01/28/2025    VLDL CHOL 10 01/28/2025    TRIGLYCERIDES 52 01/28/2025     Lab Results   Component Value Date    GLUCOSE 117 (H) 02/04/2025    BUN 16 02/04/2025    CREATININE 0.56 (L) 02/04/2025     02/04/2025    K 4.1 02/04/2025     02/04/2025    CALCIUM 9.2 02/04/2025    PROTEINTOT 7.1 02/04/2025    ALBUMIN 3.7 02/04/2025    ALT 21 02/04/2025    AST 23 02/04/2025    ALKPHOS 98 02/04/2025    BILITOT 0.2 02/04/2025    GLOB 3.4 02/04/2025    AGRATIO 1.1 02/04/2025    BCR 28.6 (H) 02/04/2025    ANIONGAP 11.0 02/04/2025    EGFR 95.9 02/04/2025     Lab Results   Component Value Date    WBC 9.04 02/04/2025    HGB 13.2 02/04/2025    HCT 41.2 02/04/2025    MCV 94.3 02/04/2025     02/04/2025     Lab Results "   Component Value Date    HGBA1C 5.70 (H) 01/28/2025            Diagnoses and all orders for this visit:    1. Palpitations (Primary)  Overview:  Stress echo (4/25/2017): LVEF 65%. No significant valvular disease. Normal stress echo.     Assessment & Plan:  Benign palpitation symptoms in the setting of pneumonia  No evidence of arrhythmia on EKG in ER  Obtain echo to ensure structurally normal heart    Orders:  -     Adult Transthoracic Echo Complete W/ Cont if Necessary Per Protocol; Future                 Echo  If heart structurally normal, no further cardiology workup needed          LINDA Osborne MD, FACC, [facility]  Interventional Cardiology  02/18/25  16:17 EST

## 2025-02-17 PROBLEM — R00.2 PALPITATIONS: Status: ACTIVE | Noted: 2025-02-17

## 2025-02-18 ENCOUNTER — OFFICE VISIT (OUTPATIENT)
Dept: CARDIOLOGY | Facility: CLINIC | Age: 75
End: 2025-02-18
Payer: MEDICARE

## 2025-02-18 ENCOUNTER — PATIENT ROUNDING (BHMG ONLY) (OUTPATIENT)
Dept: CARDIOLOGY | Facility: CLINIC | Age: 75
End: 2025-02-18
Payer: MEDICARE

## 2025-02-18 VITALS
HEART RATE: 90 BPM | OXYGEN SATURATION: 96 % | SYSTOLIC BLOOD PRESSURE: 110 MMHG | WEIGHT: 93.4 LBS | HEIGHT: 62 IN | DIASTOLIC BLOOD PRESSURE: 50 MMHG | BODY MASS INDEX: 17.19 KG/M2

## 2025-02-18 DIAGNOSIS — R00.2 PALPITATIONS: Primary | ICD-10-CM

## 2025-02-18 PROBLEM — I49.40 PREMATURE HEARTBEATS: Status: RESOLVED | Noted: 2022-02-21 | Resolved: 2025-02-18

## 2025-02-18 PROCEDURE — 99203 OFFICE O/P NEW LOW 30 MIN: CPT | Performed by: INTERNAL MEDICINE

## 2025-02-18 PROCEDURE — 1159F MED LIST DOCD IN RCRD: CPT | Performed by: INTERNAL MEDICINE

## 2025-02-18 PROCEDURE — 1160F RVW MEDS BY RX/DR IN RCRD: CPT | Performed by: INTERNAL MEDICINE

## 2025-02-18 NOTE — ASSESSMENT & PLAN NOTE
Benign palpitation symptoms in the setting of pneumonia  No evidence of arrhythmia on EKG in ER  Obtain echo to ensure structurally normal heart

## 2025-02-18 NOTE — PROGRESS NOTES
February 18, 2025    Hello, may I speak with Kelly Waldrop?    My name is JONH ALMENDAREZ      I am  with [facility]  [address]  [place] [address] [phone].    Before we get started may I verify your date of birth? 1950    I am calling to officially welcome you to our practice and ask about your recent visit. Is this a good time to talk? yes    Tell me about your visit with us. What things went well?  EVERYTHING, YOU GUYS DID A GOOD JOB.       We're always looking for ways to make our patients' experiences even better. Do you have recommendations on ways we may improve?  no    Overall were you satisfied with your first visit to our practice? yes       I appreciate you taking the time to speak with me today. Is there anything else I can do for you? no      Thank you, and have a great day.

## 2025-02-26 ENCOUNTER — TELEPHONE (OUTPATIENT)
Dept: INTERNAL MEDICINE | Facility: CLINIC | Age: 75
End: 2025-02-26

## 2025-02-26 DIAGNOSIS — J18.9 PNEUMONIA OF RIGHT LOWER LOBE DUE TO INFECTIOUS ORGANISM: Primary | ICD-10-CM

## 2025-02-26 NOTE — TELEPHONE ENCOUNTER
"  Caller: Kelly Waldrop \"Joseey\"    Relationship: Self    Best call back number: [phone]     What is the best time to reach you: AFTERNOON    Who are you requesting to speak with (clinical staff, provider,  specific staff member): CLINICAL STAFF    What was the call regarding: PATIENT HAD PNEUMONIA IN A COMPROMISED LUNG, SHE HAS AN XRAY SCHEDULED FOR MARCH 14, SHE WOULD LIKE TO KNOW ARE THERE ANY LABS OR ANYTHING THAT SHE SHOULD DO TO SEE IF INFECTION IS STILL PRESENT?    Is it okay if the provider responds through MyChart: YES      "

## 2025-03-06 ENCOUNTER — TELEPHONE (OUTPATIENT)
Dept: INTERNAL MEDICINE | Facility: CLINIC | Age: 75
End: 2025-03-06
Payer: MEDICARE

## 2025-03-06 NOTE — TELEPHONE ENCOUNTER
"    Caller: Kelly Waldrop \"Candy\"     Relationship: [unfilled]     Best call back number: [phone]    What is your medical concern? PT STATED THAT SHE IS SCHEDULED 3/13 FOR DIAGNOSTIC XRAY AND WAS TOLD BY PCP TO FAST FOR PROTEIN TEST, PT WAS TOLD THAT NORMALLY FASTING DOES NOT NEED TO BE DONE, SHE WAS TOLD THIS BY DIAGNOSTIC PLACE, PT CALLED TO VERIFY IF SHE NEEDS TO FAST  "

## 2025-03-06 NOTE — TELEPHONE ENCOUNTER
I spoke to patient and informed her that the protein test is bloodwork and that they are 2 separate tests and that she did not need to fast for the protein test. Patient verbalized understanding

## 2025-03-12 ENCOUNTER — LAB (OUTPATIENT)
Dept: LAB | Facility: HOSPITAL | Age: 75
End: 2025-03-12
Payer: MEDICARE

## 2025-03-12 ENCOUNTER — HOSPITAL ENCOUNTER (OUTPATIENT)
Dept: GENERAL RADIOLOGY | Facility: HOSPITAL | Age: 75
Discharge: HOME | End: 2025-03-12
Payer: MEDICARE

## 2025-03-12 DIAGNOSIS — J18.9 PNEUMONIA OF RIGHT LOWER LOBE DUE TO INFECTIOUS ORGANISM: ICD-10-CM

## 2025-03-12 LAB
BASOPHILS # BLD AUTO: 0.09 10*3/MM3 (ref 0–0.2)
BASOPHILS NFR BLD AUTO: 1.2 % (ref 0–1.5)
CRP SERPL-MCNC: <0.3 MG/DL (ref 0–0.5)
DEPRECATED RDW RBC AUTO: 41.1 FL (ref 37–54)
EOSINOPHIL # BLD AUTO: 0.24 10*3/MM3 (ref 0–0.4)
EOSINOPHIL NFR BLD AUTO: 3.1 % (ref 0.3–6.2)
ERYTHROCYTE [DISTWIDTH] IN BLOOD BY AUTOMATED COUNT: 11.8 % (ref 12.3–15.4)
ERYTHROCYTE [SEDIMENTATION RATE] IN BLOOD: 19 MM/HR (ref 0–30)
HCT VFR BLD AUTO: 45.1 % (ref 34–46.6)
HGB BLD-MCNC: 14.3 G/DL (ref 12–15.9)
IMM GRANULOCYTES # BLD AUTO: 0.02 10*3/MM3 (ref 0–0.05)
IMM GRANULOCYTES NFR BLD AUTO: 0.3 % (ref 0–0.5)
LYMPHOCYTES # BLD AUTO: 2.21 10*3/MM3 (ref 0.7–3.1)
LYMPHOCYTES NFR BLD AUTO: 28.6 % (ref 19.6–45.3)
MCH RBC QN AUTO: 30.4 PG (ref 26.6–33)
MCHC RBC AUTO-ENTMCNC: 31.7 G/DL (ref 31.5–35.7)
MCV RBC AUTO: 95.8 FL (ref 79–97)
MONOCYTES # BLD AUTO: 0.63 10*3/MM3 (ref 0.1–0.9)
MONOCYTES NFR BLD AUTO: 8.2 % (ref 5–12)
NEUTROPHILS NFR BLD AUTO: 4.54 10*3/MM3 (ref 1.7–7)
NEUTROPHILS NFR BLD AUTO: 58.6 % (ref 42.7–76)
NRBC BLD AUTO-RTO: 0 /100 WBC (ref 0–0.2)
PLATELET # BLD AUTO: 234 10*3/MM3 (ref 140–450)
PMV BLD AUTO: 11.1 FL (ref 6–12)
RBC # BLD AUTO: 4.71 10*6/MM3 (ref 3.77–5.28)
WBC NRBC COR # BLD AUTO: 7.73 10*3/MM3 (ref 3.4–10.8)

## 2025-03-12 PROCEDURE — 86140 C-REACTIVE PROTEIN: CPT

## 2025-03-12 PROCEDURE — 85025 COMPLETE CBC W/AUTO DIFF WBC: CPT

## 2025-03-12 PROCEDURE — 71046 X-RAY EXAM CHEST 2 VIEWS: CPT

## 2025-03-12 PROCEDURE — 85652 RBC SED RATE AUTOMATED: CPT

## 2025-03-14 ENCOUNTER — OFFICE VISIT (OUTPATIENT)
Dept: INTERNAL MEDICINE | Facility: CLINIC | Age: 75
End: 2025-03-14
Payer: MEDICARE

## 2025-03-14 VITALS
TEMPERATURE: 97.7 F | HEIGHT: 62 IN | DIASTOLIC BLOOD PRESSURE: 60 MMHG | HEART RATE: 84 BPM | SYSTOLIC BLOOD PRESSURE: 120 MMHG | WEIGHT: 93 LBS | BODY MASS INDEX: 17.11 KG/M2

## 2025-03-14 DIAGNOSIS — I34.1 MVP (MITRAL VALVE PROLAPSE): ICD-10-CM

## 2025-03-14 DIAGNOSIS — J98.4 SCARRING OF LUNG: ICD-10-CM

## 2025-03-14 DIAGNOSIS — R00.2 PALPITATIONS: Primary | ICD-10-CM

## 2025-03-14 DIAGNOSIS — F41.3 OTHER MIXED ANXIETY DISORDERS: ICD-10-CM

## 2025-03-14 RX ORDER — TRIAMCINOLONE ACETONIDE 1 MG/G
1 CREAM TOPICAL 2 TIMES DAILY PRN
Qty: 15 G | Refills: 0 | Status: SHIPPED | OUTPATIENT
Start: 2025-03-14

## 2025-03-14 NOTE — ASSESSMENT & PLAN NOTE
Psychological condition is improving with treatment.  Continue current treatment regimen.  Psychological condition  will be reassessed in 6 months.Counseled patient regarding multimodal approach with healthy nutrition, healthy sleep, regular physical activity, social activities, and meditation. (4 count in through the nose and hold for 7 count and then slow 8 count out through mouth)

## 2025-03-14 NOTE — PROGRESS NOTES
Chief Complaint   Patient presents with    Anxiety    Palpitations       History of Present Illness  74 y.o. female presents for follow up from ER and palpitations and pneumonia. Her breathing is good and no more palpitations.     Review of Systems   Constitutional:  Negative for chills and fever.   Respiratory:  Negative for cough and shortness of breath.    Cardiovascular:  Negative for chest pain and palpitations.   Gastrointestinal:  Negative for abdominal pain, nausea and vomiting.   Skin:  Negative for rash.   Neurological:  Negative for dizziness, light-headedness and headaches.   Psychiatric/Behavioral:  The patient is nervous/anxious (much better).    All other systems reviewed and are negative.    .    PMSFH:  The following portions of the patient's history were reviewed and updated as appropriate: allergies, current medications, past family history, past medical history, past social history, past surgical history and problem list.      Current Outpatient Medications:     busPIRone (BUSPAR) 15 MG tablet, Take 1 tablet by mouth 2 (Two) Times a Day. Pt states she takes 1/3 of a 15 mg tab bid, Disp: , Rfl:     Cholecalciferol (Vitamin D3) 25 MCG (1000 UT) capsule, Take 1 capsule by mouth Daily., Disp: , Rfl:     guaiFENesin (MUCINEX) 600 MG 12 hr tablet, Take  by mouth. As needed, Disp: , Rfl:     oxazepam (SERAX) 10 MG capsule, Take 1 capsule by mouth 3 (Three) Times a Day. 1 q am,  1 q afternoon, 2 qhs, Disp: , Rfl:     PARoxetine (PAXIL) 10 MG tablet, Take 1 tablet by mouth Every Morning. Take 1/4 every other day, Disp: , Rfl:     VITAMIN E-400 PO, Take 1 tablet by mouth 2 (Two) Times a Day. (Patient taking differently: Take 1 tablet by mouth As Needed.), Disp: , Rfl:     zolpidem (AMBIEN) 10 MG tablet, Take 1 tablet by mouth At Night As Needed. (Patient taking differently: Take 1 tablet by mouth At Night As Needed. As needed), Disp: , Rfl:     triamcinolone (KENALOG) 0.1 % cream, Apply 1 Application  "topically to the appropriate area as directed 2 (Two) Times a Day As Needed for Rash or Irritation. For up to 1 week, Disp: 15 g, Rfl: 0    VITALS:  /60   Pulse 84   Temp 97.7 °F (36.5 °C)   Ht 157.5 cm (62.01\")   Wt 42.2 kg (93 lb)   LMP 01/28/2006 (Approximate)   BMI 17.01 kg/m²     Physical Exam  Constitutional:       Appearance: Normal appearance.   Cardiovascular:      Rate and Rhythm: Normal rate and regular rhythm.   Pulmonary:      Effort: Pulmonary effort is normal.      Breath sounds: No wheezing or rales.   Neurological:      General: No focal deficit present.      Mental Status: She is alert and oriented to person, place, and time.   Psychiatric:         Mood and Affect: Mood normal.         Behavior: Behavior normal.         LABS:    CMP:  Lab Results   Component Value Date    BUN 16 02/04/2025    CREATININE 0.56 (L) 02/04/2025    EGFRIFNONA 88 10/11/2021     02/04/2025    K 4.1 02/04/2025     02/04/2025    CALCIUM 9.2 02/04/2025    ALBUMIN 3.7 02/04/2025    BILITOT 0.2 02/04/2025    ALKPHOS 98 02/04/2025    AST 23 02/04/2025    ALT 21 02/04/2025     CBC:  Lab Results   Component Value Date    WBC 7.73 03/12/2025    RBC 4.71 03/12/2025    HGB 14.3 03/12/2025    HCT 45.1 03/12/2025    MCV 95.8 03/12/2025    MCH 30.4 03/12/2025    MCHC 31.7 03/12/2025    RDW 11.8 (L) 03/12/2025     03/12/2025     LIPID PANEL:  Lab Results   Component Value Date    TRIG 52 01/28/2025    HDL 52 01/28/2025    VLDL 10 01/28/2025     (H) 01/28/2025    LDLHDL 2.18 01/28/2025     TSH:  Lab Results   Component Value Date    TSH 1.580 02/04/2025     Procedures         ASSESSMENT/PLAN:  Diagnoses and all orders for this visit:    1. Palpitations (Primary)  Assessment & Plan:  Much improved.       2. Other mixed anxiety disorders  Assessment & Plan:  Psychological condition is improving with treatment.  Continue current treatment regimen.  Psychological condition  will be reassessed in 6 " months.Counseled patient regarding multimodal approach with healthy nutrition, healthy sleep, regular physical activity, social activities, and meditation. (4 count in through the nose and hold for 7 count and then slow 8 count out through mouth)      3. MVP (mitral valve prolapse)  Assessment & Plan:  Follow echo and doing well.       4. Scarring of lung  Assessment & Plan:  Right lung base scarring and chest xray improved and low inflammatory numbers       Other orders  -     triamcinolone (KENALOG) 0.1 % cream; Apply 1 Application topically to the appropriate area as directed 2 (Two) Times a Day As Needed for Rash or Irritation. For up to 1 week  Dispense: 15 g; Refill: 0        FOLLOW-UP:  Return for Next scheduled follow up.      Electronically signed by:    Hernan Hoover MD

## 2025-05-01 ENCOUNTER — HOSPITAL ENCOUNTER (OUTPATIENT)
Dept: CARDIOLOGY | Facility: HOSPITAL | Age: 75
Discharge: HOME | End: 2025-05-01
Admitting: INTERNAL MEDICINE
Payer: MEDICARE

## 2025-05-01 VITALS — WEIGHT: 92.59 LBS | HEIGHT: 62 IN | BODY MASS INDEX: 17.04 KG/M2

## 2025-05-01 DIAGNOSIS — R00.2 PALPITATIONS: ICD-10-CM

## 2025-05-01 LAB
AORTIC DIMENSIONLESS INDEX: 0.88 (DI)
ASCENDING AORTA: 3.1 CM
AV MEAN PRESS GRAD SYS DOP V1V2: 2.33 MMHG
AV VMAX SYS DOP: 108 CM/SEC
BH CV ECHO MEAS - AO MAX PG: 4.7 MMHG
BH CV ECHO MEAS - AO V2 VTI: 22 CM
BH CV ECHO MEAS - AVA(I,D): 2.8 CM2
BH CV ECHO MEAS - EDV(CUBED): 32.8 ML
BH CV ECHO MEAS - EDV(MOD-SP2): 41.5 ML
BH CV ECHO MEAS - EDV(MOD-SP4): 37.7 ML
BH CV ECHO MEAS - EF(MOD-SP2): 66.5 %
BH CV ECHO MEAS - EF(MOD-SP4): 67.4 %
BH CV ECHO MEAS - ESV(CUBED): 6.9 ML
BH CV ECHO MEAS - ESV(MOD-SP2): 13.9 ML
BH CV ECHO MEAS - ESV(MOD-SP4): 12.3 ML
BH CV ECHO MEAS - FS: 40.6 %
BH CV ECHO MEAS - IVS/LVPW: 0.78 CM
BH CV ECHO MEAS - IVSD: 0.7 CM
BH CV ECHO MEAS - LAT PEAK E' VEL: 12 CM/SEC
BH CV ECHO MEAS - LV DIASTOLIC VOL/BSA (35-75): 27.3 CM2
BH CV ECHO MEAS - LV MASS(C)D: 65.3 GRAMS
BH CV ECHO MEAS - LV MAX PG: 3.8 MMHG
BH CV ECHO MEAS - LV MEAN PG: 2 MMHG
BH CV ECHO MEAS - LV SYSTOLIC VOL/BSA (12-30): 8.9 CM2
BH CV ECHO MEAS - LV V1 MAX: 97.8 CM/SEC
BH CV ECHO MEAS - LV V1 VTI: 19.3 CM
BH CV ECHO MEAS - LVIDD: 3.2 CM
BH CV ECHO MEAS - LVIDS: 1.9 CM
BH CV ECHO MEAS - LVOT AREA: 3.1 CM2
BH CV ECHO MEAS - LVOT DIAM: 2 CM
BH CV ECHO MEAS - LVPWD: 0.9 CM
BH CV ECHO MEAS - MED PEAK E' VEL: 11.3 CM/SEC
BH CV ECHO MEAS - MV A MAX VEL: 84.5 CM/SEC
BH CV ECHO MEAS - MV DEC SLOPE: 413 CM/SEC2
BH CV ECHO MEAS - MV DEC TIME: 0.21 SEC
BH CV ECHO MEAS - MV E MAX VEL: 88.1 CM/SEC
BH CV ECHO MEAS - MV E/A: 1.04
BH CV ECHO MEAS - MV MAX PG: 5 MMHG
BH CV ECHO MEAS - MV MEAN PG: 2 MMHG
BH CV ECHO MEAS - MV P1/2T: 78.7 MSEC
BH CV ECHO MEAS - MV V2 VTI: 23.4 CM
BH CV ECHO MEAS - MVA(P1/2T): 2.8 CM2
BH CV ECHO MEAS - MVA(VTI): 2.6 CM2
BH CV ECHO MEAS - PA ACC TIME: 0.14 SEC
BH CV ECHO MEAS - RVSP: 30 MMHG
BH CV ECHO MEAS - SV(LVOT): 60.7 ML
BH CV ECHO MEAS - SV(MOD-SP2): 27.6 ML
BH CV ECHO MEAS - SV(MOD-SP4): 25.4 ML
BH CV ECHO MEAS - SVI(LVOT): 44 ML/M2
BH CV ECHO MEAS - SVI(MOD-SP2): 20 ML/M2
BH CV ECHO MEAS - SVI(MOD-SP4): 18.4 ML/M2
BH CV ECHO MEAS - TAPSE (>1.6): 2.43 CM
BH CV ECHO MEAS - TR MAX PG: 20.5 MMHG
BH CV ECHO MEAS - TR MAX VEL: 225.3 CM/SEC
BH CV ECHO MEASUREMENTS AVERAGE E/E' RATIO: 7.56
BH CV VAS BP RIGHT ARM: NORMAL MMHG
BH CV XLRA - RV BASE: 4 CM
BH CV XLRA - RV LENGTH: 4.8 CM
BH CV XLRA - RV MID: 3.1 CM
BH CV XLRA - TDI S': 11.7 CM/SEC
IVRT: 85 MS
LEFT ATRIUM VOLUME INDEX: 22.8 ML/M2
LV EF 2D ECHO EST: 65 %
LV EF BIPLANE MOD: 65.7 %

## 2025-05-01 PROCEDURE — 93306 TTE W/DOPPLER COMPLETE: CPT

## 2025-05-02 ENCOUNTER — RESULTS FOLLOW-UP (OUTPATIENT)
Dept: CARDIOLOGY | Facility: CLINIC | Age: 75
End: 2025-05-02
Payer: MEDICARE

## 2025-05-02 NOTE — LETTER
Kelly Waldrop  [address] [facility] [facility] [address]    May 2, 2025     Dear Ms. Waldrop:    Reviewed your echo. Heart squeezing function is normal. Echo does confirm that you do have mild mitral valve prolapse. This is associated with mild leakiness of the mitral valve. Presently I do not believe this is a cause of any symptoms and can be monitored. If you were to develop worsening shortness of breath, a repeat echo would be recommended.     If you have any questions or concerns, please don't hesitate to call.         Sincerely,        Emanuel Osborne IV, MD